# Patient Record
Sex: FEMALE | Race: WHITE | NOT HISPANIC OR LATINO | URBAN - METROPOLITAN AREA
[De-identification: names, ages, dates, MRNs, and addresses within clinical notes are randomized per-mention and may not be internally consistent; named-entity substitution may affect disease eponyms.]

---
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
## 2024-02-21 ENCOUNTER — OFFICE VISIT (OUTPATIENT)
Dept: FAMILY MEDICINE CLINIC | Facility: CLINIC | Age: 19
End: 2024-02-21
Payer: COMMERCIAL

## 2024-02-21 VITALS
HEIGHT: 60 IN | DIASTOLIC BLOOD PRESSURE: 72 MMHG | WEIGHT: 103.8 LBS | BODY MASS INDEX: 20.38 KG/M2 | SYSTOLIC BLOOD PRESSURE: 112 MMHG | OXYGEN SATURATION: 99 % | HEART RATE: 78 BPM | TEMPERATURE: 97.6 F

## 2024-02-21 DIAGNOSIS — R21 RASH: Primary | ICD-10-CM

## 2024-02-21 DIAGNOSIS — B35.9 RINGWORM: ICD-10-CM

## 2024-02-21 PROCEDURE — 99213 OFFICE O/P EST LOW 20 MIN: CPT | Performed by: STUDENT IN AN ORGANIZED HEALTH CARE EDUCATION/TRAINING PROGRAM

## 2024-02-21 RX ORDER — CLOTRIMAZOLE AND BETAMETHASONE DIPROPIONATE 10; .64 MG/G; MG/G
CREAM TOPICAL 2 TIMES DAILY
Qty: 45 G | Refills: 0 | Status: SHIPPED | OUTPATIENT
Start: 2024-02-21

## 2024-02-21 NOTE — PROGRESS NOTES
Name: Soraya Gómez      : 2005      MRN: [id number]  Encounter Provider: Silvestre Pearson MD  Encounter Date: 2024   Encounter department: [facility] PRIMARY CARE    Assessment & Plan     1. Rash  -     clotrimazole-betamethasone (LOTRISONE) 1-0.05 % cream; Apply topically 2 (two) times a day    2. Ringworm  -     clotrimazole-betamethasone (LOTRISONE) 1-0.05 % cream; Apply topically 2 (two) times a day    Rash may be a nummular eczema versus ringworm unclear.  Will trial Lotrisone if no improvement discussed with patient potential need for biopsy.  She will follow-up in 4-6 weeks.       Subjective      Rash  Pertinent negatives include no congestion, cough, diarrhea, fatigue, fever, rhinorrhea or shortness of breath.     Is a 18-year-old female presents the office companied by mother for concerns of transient rash.  Patient states the rash started approximately 2 months ago this is a small lesions that appear last for approximately 3 weeks and self resolved.  States the rash is itchy denies any exposure to new contacts.  Has been not utilizing any OTC creams.  Denies any new medications.      Review of Systems   Constitutional:  Negative for activity change, appetite change, chills, fatigue and fever.   HENT:  Negative for congestion, dental problem, drooling, ear discharge, ear pain, facial swelling, postnasal drip, rhinorrhea and sinus pain.    Eyes:  Negative for photophobia, pain, discharge and itching.   Respiratory:  Negative for apnea, cough, chest tightness and shortness of breath.    Cardiovascular:  Negative for chest pain and leg swelling.   Gastrointestinal:  Negative for abdominal distention, abdominal pain, anal bleeding, constipation, diarrhea and nausea.   Endocrine: Negative for cold intolerance, heat intolerance and polydipsia.   Genitourinary:  Negative for difficulty urinating.   Musculoskeletal:  Negative for arthralgias, gait problem, joint swelling and myalgias.    Skin:  Positive for rash. Negative for color change and pallor.   Allergic/Immunologic: Negative for immunocompromised state.   Neurological:  Negative for dizziness, seizures, facial asymmetry, weakness, light-headedness, numbness and headaches.   Psychiatric/Behavioral:  Negative for agitation, behavioral problems, confusion, decreased concentration and dysphoric mood.    All other systems reviewed and are negative.      Current Outpatient Medications on File Prior to Visit   Medication Sig    acetaminophen (TYLENOL) 500 mg tablet Take 500 mg by mouth every 6 (six) hours as needed for mild pain    Tri-Lo-Padmini 0.18/0.215/0.25 MG-25 MCG per tablet take 1 tablet by mouth once daily       Objective     /72 (BP Location: Left arm, Patient Position: Sitting, Cuff Size: Adult)   Pulse 78   Temp 97.6 °F (36.4 °C) (Tympanic)   Ht 5' (1.524 m)   Wt 47.1 kg (103 lb 12.8 oz)   SpO2 99%   BMI 20.27 kg/m²     Physical Exam  Vitals and nursing note reviewed.   HENT:      Head: Normocephalic and atraumatic.      Right Ear: Tympanic membrane normal.      Left Ear: Tympanic membrane normal.      Nose: Nose normal.      Mouth/Throat:      Mouth: Mucous membranes are moist.   Eyes:      Pupils: Pupils are equal, round, and reactive to light.   Cardiovascular:      Rate and Rhythm: Normal rate and regular rhythm.   Pulmonary:      Effort: Pulmonary effort is normal.   Abdominal:      General: Abdomen is flat.   Musculoskeletal:         General: Normal range of motion.      Cervical back: Normal range of motion.   Skin:     General: Skin is warm.      Capillary Refill: Capillary refill takes less than 2 seconds.   Neurological:      General: No focal deficit present.      Mental Status: She is alert and oriented to person, place, and time.   Psychiatric:         Mood and Affect: Mood normal.       Silvestre Pearson MD

## 2024-03-03 DIAGNOSIS — Z30.9 ENCOUNTER FOR CONTRACEPTIVE MANAGEMENT, UNSPECIFIED TYPE: ICD-10-CM

## 2024-03-04 RX ORDER — NORGESTIMATE AND ETHINYL ESTRADIOL 7DAYSX3 LO
1 KIT ORAL DAILY
Qty: 28 TABLET | Refills: 2 | Status: SHIPPED | OUTPATIENT
Start: 2024-03-04

## 2024-05-30 DIAGNOSIS — Z30.9 ENCOUNTER FOR CONTRACEPTIVE MANAGEMENT, UNSPECIFIED TYPE: ICD-10-CM

## 2024-05-31 RX ORDER — NORGESTIMATE AND ETHINYL ESTRADIOL 7DAYSX3 LO
1 KIT ORAL DAILY
Qty: 28 TABLET | Refills: 2 | Status: SHIPPED | OUTPATIENT
Start: 2024-05-31

## 2024-06-14 ENCOUNTER — ULTRASOUND (OUTPATIENT)
Dept: OBGYN CLINIC | Facility: CLINIC | Age: 19
End: 2024-06-14
Payer: COMMERCIAL

## 2024-06-14 ENCOUNTER — TELEPHONE (OUTPATIENT)
Age: 19
End: 2024-06-14

## 2024-06-14 VITALS
DIASTOLIC BLOOD PRESSURE: 68 MMHG | HEIGHT: 60 IN | BODY MASS INDEX: 20.5 KG/M2 | WEIGHT: 104.4 LBS | SYSTOLIC BLOOD PRESSURE: 100 MMHG

## 2024-06-14 DIAGNOSIS — Z34.92 SECOND TRIMESTER PREGNANCY: ICD-10-CM

## 2024-06-14 DIAGNOSIS — N91.2 AMENORRHEA: Primary | ICD-10-CM

## 2024-06-14 PROCEDURE — 99203 OFFICE O/P NEW LOW 30 MIN: CPT | Performed by: OBSTETRICS & GYNECOLOGY

## 2024-06-14 PROCEDURE — 76801 OB US < 14 WKS SINGLE FETUS: CPT | Performed by: OBSTETRICS & GYNECOLOGY

## 2024-06-14 RX ORDER — PRENATAL WITH FERROUS FUM AND FOLIC ACID 3080; 920; 120; 400; 22; 1.84; 3; 20; 10; 1; 12; 200; 27; 25; 2 [IU]/1; [IU]/1; MG/1; [IU]/1; MG/1; MG/1; MG/1; MG/1; MG/1; MG/1; UG/1; MG/1; MG/1; MG/1; MG/1
1 TABLET ORAL DAILY
Qty: 90 TABLET | Refills: 1 | Status: SHIPPED | OUTPATIENT
Start: 2024-06-14

## 2024-06-14 NOTE — PROGRESS NOTES
Assessment  18 y.o.  presenting for amenorrhea. Pregnancy confirmed, dating inconsistent with LMP. JULIA 24 (understands tentative and subject to confirmation by MFM).    Plan  Diagnoses and all orders for this visit:    Amenorrhea  -     AMB US OB < 14 weeks single or first gestation level 1    Second trimester pregnancy  -     Ambulatory Referral to Maternal Fetal Medicine; Future  -     Prenatal 27-1 MG TABS; Take 1 tablet by mouth in the morning  - Prenatal vitamin  - Prenatal Nursing Intake in 2 weeks        - Prenatal H&P in 4 weeks     ____________________________________________________________      Subjective   Soraya Gómez is a 18 y.o. G0 with an LMP of Patient's last menstrual period was 2024 (approximate). (10w4d by LMP) who presents for amenorrhea. She notes she took a home pregnancy test and it was positive. She is currently otherwise without complaint. Some intermittent nausea noted, less frequent than prior.    Patient notes that this pregnancy was unplanned and until recently was unsure if she was planning childbearing. Plans to continue pregnancy and not interested in discussing alternatives. She was using contraception at the time, but inconsistent with taking OCP. She reports she is certain of her LMP and that she has regular menses. She has has no vaginal bleeding since her LMP.  First pregnancy.     The following portions of the patient's history were reviewed and updated as appropriate: allergies, current medications, past medical history, past social history, past surgical history, and problem list.    Review of Systems  Review of Systems   Constitutional:  Negative for chills, fatigue and fever.   Respiratory:  Negative for shortness of breath.    Cardiovascular:  Negative for chest pain and palpitations.   Gastrointestinal:  Positive for nausea and vomiting. Negative for abdominal distention, abdominal pain, constipation and diarrhea.   Genitourinary:  Positive for menstrual  problem (amenorrhea). Negative for dysuria, flank pain, frequency, genital sores, pelvic pain, vaginal discharge and vaginal pain.   Skin:  Negative for rash and wound.   Neurological:  Negative for dizziness, light-headedness and headaches.          Objective  /68   Ht 5' (1.524 m)   Wt 47.4 kg (104 lb 6.4 oz)   LMP 04/01/2024 (Approximate)   BMI 20.39 kg/m²       Physical Exam:  Physical Exam  Vitals reviewed. Exam conducted with a chaperone present.   Constitutional:       General: She is not in acute distress.     Appearance: Normal appearance. She is not ill-appearing, toxic-appearing or diaphoretic.   Eyes:      General: No scleral icterus.        Right eye: No discharge.         Left eye: No discharge.      Conjunctiva/sclera: Conjunctivae normal.   Cardiovascular:      Rate and Rhythm: Normal rate.   Pulmonary:      Effort: Pulmonary effort is normal. No respiratory distress.   Abdominal:      General: There is no distension.      Palpations: There is no mass.      Tenderness: There is no abdominal tenderness. There is no guarding or rebound.      Hernia: No hernia is present.   Genitourinary:     General: Normal vulva.      Exam position: Lithotomy position.      Labia:         Right: No rash, tenderness or lesion.         Left: No rash, tenderness or lesion.       Urethra: No prolapse, urethral swelling or urethral lesion.      Vagina: No bleeding.   Musculoskeletal:         General: No swelling.   Skin:     General: Skin is warm and dry.      Coloration: Skin is not jaundiced or pale.      Findings: No bruising or erythema.   Neurological:      Mental Status: She is alert.   Psychiatric:         Mood and Affect: Mood normal.         Behavior: Behavior normal.         Thought Content: Thought content normal.         Judgment: Judgment normal.

## 2024-06-17 ENCOUNTER — LAB (OUTPATIENT)
Dept: LAB | Facility: MEDICAL CENTER | Age: 19
End: 2024-06-17
Payer: COMMERCIAL

## 2024-06-17 ENCOUNTER — INITIAL PRENATAL (OUTPATIENT)
Dept: OBGYN CLINIC | Facility: MEDICAL CENTER | Age: 19
End: 2024-06-17
Payer: COMMERCIAL

## 2024-06-17 ENCOUNTER — TELEPHONE (OUTPATIENT)
Age: 19
End: 2024-06-17

## 2024-06-17 VITALS
HEIGHT: 60 IN | BODY MASS INDEX: 20.97 KG/M2 | WEIGHT: 106.8 LBS | DIASTOLIC BLOOD PRESSURE: 62 MMHG | SYSTOLIC BLOOD PRESSURE: 92 MMHG

## 2024-06-17 DIAGNOSIS — Z34.01 ENCOUNTER FOR SUPERVISION OF NORMAL FIRST PREGNANCY IN FIRST TRIMESTER: Primary | ICD-10-CM

## 2024-06-17 DIAGNOSIS — Z31.430 ENCOUNTER OF FEMALE FOR TESTING FOR GENETIC DISEASE CARRIER STATUS FOR PROCREATIVE MANAGEMENT: ICD-10-CM

## 2024-06-17 DIAGNOSIS — Z34.01 ENCOUNTER FOR SUPERVISION OF NORMAL FIRST PREGNANCY IN FIRST TRIMESTER: ICD-10-CM

## 2024-06-17 LAB
ABO GROUP BLD: NORMAL
BACTERIA UR QL AUTO: NORMAL /HPF
BASOPHILS # BLD AUTO: 0.04 THOUSANDS/ÂΜL (ref 0–0.1)
BASOPHILS NFR BLD AUTO: 0 % (ref 0–1)
BILIRUB UR QL STRIP: NEGATIVE
BLD GP AB SCN SERPL QL: NEGATIVE
CLARITY UR: CLEAR
COLOR UR: ABNORMAL
EOSINOPHIL # BLD AUTO: 0.02 THOUSAND/ÂΜL (ref 0–0.61)
EOSINOPHIL NFR BLD AUTO: 0 % (ref 0–6)
ERYTHROCYTE [DISTWIDTH] IN BLOOD BY AUTOMATED COUNT: 13.6 % (ref 11.6–15.1)
GLUCOSE UR STRIP-MCNC: NEGATIVE MG/DL
HBV SURFACE AB SER-ACNC: 6.11 MIU/ML
HBV SURFACE AG SER QL: NORMAL
HCT VFR BLD AUTO: 36.5 % (ref 34.8–46.1)
HCV AB SER QL: NORMAL
HGB BLD-MCNC: 12.3 G/DL (ref 11.5–15.4)
HGB UR QL STRIP.AUTO: NEGATIVE
HIV 1+2 AB+HIV1 P24 AG SERPL QL IA: NORMAL
HIV 2 AB SERPL QL IA: NORMAL
HIV1 AB SERPL QL IA: NORMAL
HIV1 P24 AG SERPL QL IA: NORMAL
IMM GRANULOCYTES # BLD AUTO: 0.05 THOUSAND/UL (ref 0–0.2)
IMM GRANULOCYTES NFR BLD AUTO: 0 % (ref 0–2)
KETONES UR STRIP-MCNC: NEGATIVE MG/DL
LEUKOCYTE ESTERASE UR QL STRIP: NEGATIVE
LYMPHOCYTES # BLD AUTO: 1.57 THOUSANDS/ÂΜL (ref 0.6–4.47)
LYMPHOCYTES NFR BLD AUTO: 13 % (ref 14–44)
MCH RBC QN AUTO: 29.9 PG (ref 26.8–34.3)
MCHC RBC AUTO-ENTMCNC: 33.7 G/DL (ref 31.4–37.4)
MCV RBC AUTO: 89 FL (ref 82–98)
MONOCYTES # BLD AUTO: 0.61 THOUSAND/ÂΜL (ref 0.17–1.22)
MONOCYTES NFR BLD AUTO: 5 % (ref 4–12)
NEUTROPHILS # BLD AUTO: 9.59 THOUSANDS/ÂΜL (ref 1.85–7.62)
NEUTS SEG NFR BLD AUTO: 82 % (ref 43–75)
NITRITE UR QL STRIP: POSITIVE
NON-SQ EPI CELLS URNS QL MICRO: NORMAL /HPF
NRBC BLD AUTO-RTO: 0 /100 WBCS
PH UR STRIP.AUTO: 7.5 [PH]
PLATELET # BLD AUTO: 220 THOUSANDS/UL (ref 149–390)
PMV BLD AUTO: 11.5 FL (ref 8.9–12.7)
PROT UR STRIP-MCNC: ABNORMAL MG/DL
RBC # BLD AUTO: 4.12 MILLION/UL (ref 3.81–5.12)
RBC #/AREA URNS AUTO: NORMAL /HPF
RH BLD: POSITIVE
RUBV IGG SERPL IA-ACNC: 198.3 IU/ML
SP GR UR STRIP.AUTO: 1.01 (ref 1–1.03)
SPECIMEN EXPIRATION DATE: NORMAL
TREPONEMA PALLIDUM IGG+IGM AB [PRESENCE] IN SERUM OR PLASMA BY IMMUNOASSAY: NORMAL
UROBILINOGEN UR STRIP-ACNC: <2 MG/DL
WBC # BLD AUTO: 11.88 THOUSAND/UL (ref 4.31–10.16)
WBC #/AREA URNS AUTO: NORMAL /HPF

## 2024-06-17 PROCEDURE — 87077 CULTURE AEROBIC IDENTIFY: CPT

## 2024-06-17 PROCEDURE — 87340 HEPATITIS B SURFACE AG IA: CPT

## 2024-06-17 PROCEDURE — 87086 URINE CULTURE/COLONY COUNT: CPT

## 2024-06-17 PROCEDURE — 85025 COMPLETE CBC W/AUTO DIFF WBC: CPT

## 2024-06-17 PROCEDURE — 86900 BLOOD TYPING SEROLOGIC ABO: CPT

## 2024-06-17 PROCEDURE — 86803 HEPATITIS C AB TEST: CPT

## 2024-06-17 PROCEDURE — 86706 HEP B SURFACE ANTIBODY: CPT

## 2024-06-17 PROCEDURE — 86762 RUBELLA ANTIBODY: CPT

## 2024-06-17 PROCEDURE — 86850 RBC ANTIBODY SCREEN: CPT

## 2024-06-17 PROCEDURE — 86901 BLOOD TYPING SEROLOGIC RH(D): CPT

## 2024-06-17 PROCEDURE — 36415 COLL VENOUS BLD VENIPUNCTURE: CPT

## 2024-06-17 PROCEDURE — 87389 HIV-1 AG W/HIV-1&-2 AB AG IA: CPT

## 2024-06-17 PROCEDURE — 86780 TREPONEMA PALLIDUM: CPT

## 2024-06-17 PROCEDURE — 87186 SC STD MICRODIL/AGAR DIL: CPT

## 2024-06-17 PROCEDURE — T1001 NURSING ASSESSMENT/EVALUATN: HCPCS

## 2024-06-17 PROCEDURE — 81001 URINALYSIS AUTO W/SCOPE: CPT

## 2024-06-17 NOTE — PROGRESS NOTES
OB INTAKE INTERVIEW 2024    Patient is 18 y.o. who presents for OB intake at 16w2d.  She is accompanied by her significant other and her mother during this encounter.  The father of her baby (Ryan) is involved in the pregnancy.      Patient's last menstrual period was 2024 (approximate).  Ultrasound: Measured 15 weeks 6 days on 24  Estimated Date of Delivery: 24 changed by dating ultrasound.    Signs/Symptoms of Pregnancy  Current pregnancy symptoms: none  Constipation no  Headaches no  Cramping/spotting no  PICA cravings no    Diabetes-  Body mass index is 20.86 kg/m².  If patient has 1 or more, please order early 1 hour GTT  History of GDM no  BMI >35 no  History of PCOS or current metformin use no  History of LGA/macrosomic infant (4000g/9lbs) no    If patient has 2 or more, please order early 1 hour GTT  BMI>30 no  AMA no  First degree relative with type 2 diabetes no  History of chronic HTN, hyperlipidemia, elevated A1C no  High risk race (, , ,  or ) no    Hypertension- if you answer yes to any of the following, please order baseline preeclampsia labs (cbc, comprehensive metabolic panel, urine protein creatinine ratio, uric acid)  History of of chronic HTN no  History of gestational HTN no  History of preeclampsia, eclampsia, or HELLP syndrome no  History of diabetes no  History of lupus, autoimmune disease, kidney disease no    Thyroid- if yes order TSH with reflex T4  History of thyroid disease no    Bleeding Disorder or Hx of DVT-patient or first degree relative with history of. Order the following if not done previously.   (Factor V, antithrombin III, prothrombin gene mutation, protein C and S Ag, lupus anticoagulant, anticardiolipin, beta-2 glycoprotein)   no    OB/GYN-  History of abnormal pap smear n/a      Date of last pap smear Not on file  History of HPV n/a  History of Herpes/HSV no  History of other STI  (gonorrhea, chlamydia, trich) no  History of prior  no  History of prior  no  History of  delivery prior to 36 weeks 6 days no  History of blood transfusion no  Ok for blood transfusion YES    Substance screening-   History of tobacco use no  Currently using tobacco no - vapes occasionally - nicotine and flavoring.  Currently using alcohol no  Presently using drugs no  Past drug use  no  IV drug use- no  Partner drug use no  Parent/Family drug use YES father passed away from drug overdose  Substance Use Screen Level - low risk    MRSA Screening-   Does the pt have a hx of MRSA? no    Immunizations:  Influenza vaccine given this season NO   Discussed Tdap vaccine YES   COVID Vaccine YES x3    Genetic/MFM-  Do you or your partner have a history of any of the following in yourselves or first degree relatives?  Cystic fibrosis no  Spinal muscular atrophy no  Hemoglobinopathy/Sickle Cell/Thalassemia no  Fragile X Intellectual Disability no    If yes, discuss Carrier Screening and recommend consultation with MFM/Genetic Counseling and place specific [facility] Referral for.    If no, discuss Carrier Screening being completed once in a lifetime as a standard of care lab test. Place orders for Cystic Fibrosis Gene Test ([id number]) and Spinal Muscular Atrophy DNA ([id number]).  Patient does desire testing for Cystic Fibrosis and Spinal Muscular Atrophy.  Orders placed.    Appointment for level ll Ultrasound at [facility]  has not been scheduled. Patient encouraged to call to schedule.  Number provided.    Interview education  [facility] Pregnancy Essentials Book reviewed, discussed and attached to their AVS YES     Prenatal lab work scripts YES    Extra labs ordered: Cystic Fibrosis gene test and Spinal muscular atrophy DNA    Aspirin/Preeclampsia Screen    Risk Level Risk Factor Recommendation   LOW Prior Uncomplicated full-term delivery no No Aspirin recommendation        MODERATE Nulliparity YES Recommend low-dose aspirin  if     BMI>30 no 2 or more moderate risk factors    Family History Preeclampsia (mother/sister) no     35yr old or greater no      or Low Socioeconomic no     IVF Pregnancy  no     Personal History Risks (low birth weight, prior adverse preg outcome, >10yr preg interval) no         HIGH History of Preeclampsia no Recommend low-dose aspirin if     Multifetal gestation no 1 or more high risk factors    Chronic HTN no     Type 1 or 2 Diabetes no     Renal Disease no     Autoimmune Disease  no      Contraindications to ASA therapy:  NSAID/ ASA allergy: no  Nasal polyps: no  Asthma with history of ASA induced bronchospasm: no  Relative contraindications:  History of GI bleed: no  Active peptic ulcer disease: no  Severe hepatic dysfunction: no    Patient does not meet recommendation to take ASA 162mg during this pregnancy from 12-36wks to lower her risk of preeclampsia.    The patient has a history now or in prior pregnancy notable for: none    Details that I feel the provider should be aware of: Soraya came in for her OB intake at 16w2d. Patient presents with her mother Alona and her boyfriend (FOB) Ryan. Patient does not have any current complaints and reports feels well. Patient is late to prenatal care. This is her first pregnancy- patient reports it was unplanned but welcomed. Patient was on oral contraceptives at time of conception but states would forget to take it at times. Contraception options briefly reviewed with patient for after delivery. Considering IUD. Prenatal panel and carrier screening ordered. Carrier screening education provided. EPDS score was 11. Patient declined baby and me referral at this time. Patient has not scheduled early anatomy scan with M. Encouraged patient to call and schedule as soon as possible. Number provided. No blood type on file. Patient encouraged to get prenatal panel completed- will go to lab next door to get blood work done.     PN1 visit scheduled. The  patient was oriented to our practice, the navigator role, reviewed delivering physicians and [facility] for delivery. All questions were answered.    Interviewed by: Marciano Keating RN

## 2024-06-17 NOTE — PATIENT INSTRUCTIONS
Congratulations! Please review our Pregnancy Essential Guide from our network's website.     [facility] Pregnancy Essentials Guide  [facility] ([email or web address])

## 2024-06-19 ENCOUNTER — TELEPHONE (OUTPATIENT)
Age: 19
End: 2024-06-19

## 2024-06-19 DIAGNOSIS — O23.42 URINARY TRACT INFECTION IN MOTHER DURING SECOND TRIMESTER OF PREGNANCY: Primary | ICD-10-CM

## 2024-06-19 LAB — BACTERIA UR CULT: ABNORMAL

## 2024-06-19 RX ORDER — NITROFURANTOIN 25; 75 MG/1; MG/1
100 CAPSULE ORAL 2 TIMES DAILY
Qty: 14 CAPSULE | Refills: 0 | Status: SHIPPED | OUTPATIENT
Start: 2024-06-19 | End: 2024-06-27

## 2024-06-19 NOTE — TELEPHONE ENCOUNTER
Per communication consent lm for pt to call back.      If patient calls back and results or message was reviewed/appointment made if needed- please close out result note. Thank you

## 2024-06-19 NOTE — TELEPHONE ENCOUNTER
"----- Message from Omaira Diaz MD sent at 6/19/2024  1:28 PM EDT -----  Please call pt with results.    UCx shows UTI. I will send treatment in for her. Please add \"needs cc urine\" to next appt notes for HANSA.     Prenatal panel is notable for low titers to hepatitis B. This means she is unvaccinated or are no longer protected by prior vaccine. Hepatitis B is a virus that you can pass to your baby during pregnancy or delivery; fortunately, labs do not show that you have been exposed to this currently. Most people have been vaccinated during childhood, but it is possible for the immunity to wear off over the years. This is a safe vaccine to receive in pregnancy and you should follow up with your PCP to discuss a booster shot.   "

## 2024-06-19 NOTE — RESULT ENCOUNTER NOTE
"Please call pt with results.    UCx shows UTI. I will send treatment in for her. Please add \"needs cc urine\" to next appt notes for HANSA.     Prenatal panel is notable for low titers to hepatitis B. This means she is unvaccinated or are no longer protected by prior vaccine. Hepatitis B is a virus that you can pass to your baby during pregnancy or delivery; fortunately, labs do not show that you have been exposed to this currently. Most people have been vaccinated during childhood, but it is possible for the immunity to wear off over the years. This is a safe vaccine to receive in pregnancy and you should follow up with your PCP to discuss a booster shot. "

## 2024-06-21 NOTE — TELEPHONE ENCOUNTER
Attempt #2- HIPAA does not indicate ok to [facility] with detail so left another voicemail to call back

## 2024-06-26 ENCOUNTER — INITIAL PRENATAL (OUTPATIENT)
Dept: OBGYN CLINIC | Facility: CLINIC | Age: 19
End: 2024-06-26
Payer: COMMERCIAL

## 2024-06-26 ENCOUNTER — TELEPHONE (OUTPATIENT)
Age: 19
End: 2024-06-26

## 2024-06-26 ENCOUNTER — TELEPHONE (OUTPATIENT)
Dept: PERINATAL CARE | Facility: OTHER | Age: 19
End: 2024-06-26

## 2024-06-26 VITALS — DIASTOLIC BLOOD PRESSURE: 60 MMHG | SYSTOLIC BLOOD PRESSURE: 104 MMHG | WEIGHT: 108.2 LBS | BODY MASS INDEX: 21.13 KG/M2

## 2024-06-26 DIAGNOSIS — Z34.92 SECOND TRIMESTER PREGNANCY: ICD-10-CM

## 2024-06-26 DIAGNOSIS — O23.42 UTI IN PREGNANCY, ANTEPARTUM, SECOND TRIMESTER: ICD-10-CM

## 2024-06-26 DIAGNOSIS — Z3A.17 17 WEEKS GESTATION OF PREGNANCY: ICD-10-CM

## 2024-06-26 DIAGNOSIS — O09.892 HIGH RISK TEEN PREGNANCY IN SECOND TRIMESTER: ICD-10-CM

## 2024-06-26 DIAGNOSIS — Z78.9 NONIMMUNE TO HEPATITIS B VIRUS: ICD-10-CM

## 2024-06-26 PROCEDURE — 87491 CHLMYD TRACH DNA AMP PROBE: CPT | Performed by: ADVANCED PRACTICE MIDWIFE

## 2024-06-26 PROCEDURE — 99213 OFFICE O/P EST LOW 20 MIN: CPT | Performed by: ADVANCED PRACTICE MIDWIFE

## 2024-06-26 PROCEDURE — 87591 N.GONORRHOEAE DNA AMP PROB: CPT | Performed by: ADVANCED PRACTICE MIDWIFE

## 2024-06-26 NOTE — PROGRESS NOTES
Initial Prenatal Visit  [facility] [address] [place]    Assessment/Plan:  Soraya is a 18 y.o. year old  at 17w4d who presents for initial prenatal visit.     Supervision of normal pregnancy  - Prenatal labs reviewed and normal.  Blood type: O positive  - Aneuploidy screening discussed.  Patient has appointment with [facility] - tomorrow  - Routine cervical cancer screening: Pap Up to date.  - Routine STI Screening: GC/Chlamydia sent today.  HIV/Hep B/Syphilis ordered in prenatal panel.  - Patient Education: Patient was counseled regarding diet, exercise, weight gain, foods to avoid, vaccines in pregnancy, aneuploidy screening, travel precautions to include seat belt use and VTE risk reduction.  She has been provided our pregnancy packet which includes how and when to contact providers, medication recommendations, dietary suggestions, breastfeeding information as well as websites for additional information, hospital and delivery concerns.       Additional Pregnancy Problems:   1. High risk teen pregnancy in second trimester  2. UTI in pregnancy, antepartum, second trimester  3. Nonimmune to hepatitis B virus  4. Second trimester pregnancy  -     Chlamydia/GC amplified DNA by PCR  -     Alpha fetoprotein, maternal; Future  5. 17 weeks gestation of pregnancy  Assessment & Plan:  Anatomy scan 2024  - Started Macrobid 2024  - family support- good. Declined- Nurse partnership  - next visit 4 weeks  Orders:  -     Chlamydia/GC amplified DNA by PCR  -     Alpha fetoprotein, maternal; Future        Subjective:   CC:  Desires prenatal care  Soraya Gómez is a 18 y.o.  female who presents for prenatal care.  Pregnancy ROS: no leakage of fluid, pelvic pain, or vaginal bleeding.  Occasional nausea/vomiting.    The following portions of the patient's history were reviewed and updated as appropriate: allergies, current medications, past family history, past medical history,  obstetric history, gynecologic history, past social history, past surgical history and problem list.      Objective:  /60   Wt 49.1 kg (108 lb 3.2 oz)   LMP 2024 (Approximate)   BMI 21.13 kg/m²   Pregravid Weight/BMI: 46.7 kg (103 lb) (BMI 20.12)  Current Weight: 49.1 kg (108 lb 3.2 oz)   Total Weight Gain: 2.359 kg (5 lb 3.2 oz)   Pre-Rani Vitals    Flowsheet Row Most Recent Value   Prenatal Assessment    Fetal Heart Rate 152   Prenatal Vitals    Blood Pressure 104/60   Weight - Scale 49.1 kg (108 lb 3.2 oz)   Urine Albumin/Glucose    Dilation/Effacement/Station    Vaginal Drainage    Edema    LLE Edema None   RLE Edema None         General: Well appearing, no distress  Respiratory: Normal respiratory rate, lungs clear to auscultation, no wheezing or rales  Cardiovascular: Regular rate and rhythm, no murmurs, rubs, or gallops  Breasts: Normal bilaterally, nontender without masses, asymmetry, or nipple discharge.  Abdomen: Soft, gravid, nontender  : Urethra normal. Normal labia majora and minora. Vagina normal.  No vaginal bleeding. No vaginal discharge. Cervix visually closed.   Extremities: Warm and well perfused.  Non tender.  No edema.

## 2024-06-26 NOTE — PATIENT INSTRUCTIONS

## 2024-06-26 NOTE — TELEPHONE ENCOUNTER
Called [facility] and spoke to Lorenzo who confirmed that PT's [employer] for tmrw's MFM appt was [email or web address]'jovanna.

## 2024-06-26 NOTE — TELEPHONE ENCOUNTER
Called [employer] spoke with Lorenzo @ # [phone] to set up [employer] transportation for patient's Maternal Fetal Medicine appointment.  Appointment scheduled on  June 27, 2024 at 2:30 PM at our [place].       Spoke with patient and explained [employer] will pick-up patient at 1:15 PM for Maternal Fetal Medicine appointment.   Patient instructed she has 5 minutes maximum to get into car upon arrival. Patient verbalized understanding.     Confirmed phone and address.   [phone]  [facility] [place] [address]

## 2024-06-26 NOTE — ASSESSMENT & PLAN NOTE
Anatomy scan 6/27/2024  - Started Macrobid 6/24/2024  - family support- good. Declined- Nurse partnership  - next visit 4 weeks

## 2024-06-27 ENCOUNTER — ROUTINE PRENATAL (OUTPATIENT)
Dept: PERINATAL CARE | Facility: OTHER | Age: 19
End: 2024-06-27
Payer: COMMERCIAL

## 2024-06-27 VITALS
WEIGHT: 107.4 LBS | DIASTOLIC BLOOD PRESSURE: 50 MMHG | BODY MASS INDEX: 20.28 KG/M2 | HEIGHT: 61 IN | SYSTOLIC BLOOD PRESSURE: 100 MMHG | HEART RATE: 87 BPM

## 2024-06-27 DIAGNOSIS — Z36.3 ENCOUNTER FOR ANTENATAL SCREENING FOR MALFORMATIONS: ICD-10-CM

## 2024-06-27 DIAGNOSIS — Z34.92 SECOND TRIMESTER PREGNANCY: ICD-10-CM

## 2024-06-27 DIAGNOSIS — Z3A.17 17 WEEKS GESTATION OF PREGNANCY: Primary | ICD-10-CM

## 2024-06-27 DIAGNOSIS — Z36.89 ENCOUNTER FOR FETAL ANATOMIC SURVEY: ICD-10-CM

## 2024-06-27 PROCEDURE — 99203 OFFICE O/P NEW LOW 30 MIN: CPT | Performed by: NURSE PRACTITIONER

## 2024-06-27 PROCEDURE — 76805 OB US >/= 14 WKS SNGL FETUS: CPT | Performed by: OBSTETRICS & GYNECOLOGY

## 2024-06-27 NOTE — LETTER
"2024     Omaira Diaz MD  [address] [place] [address]  [address] [place] [address]    Patient: Soraya Gómez   YOB: 2005   Date of Visit: 2024       Dear Dr. Diaz:    Thank you for referring Soraya Gómez to me for evaluation. Below are my notes for this consultation.    If you have questions, please do not hesitate to call me. I look forward to following your patient along with you.         Sincerely,        Amara Chahal MD        CC: No Recipients    Sara Allen  2024  4:03 PM  Sign when Signing Visit  Patient chose to have [facility] [id number] Non-Invasive Prenatal Screen [id number] MT21 PLUS Core + SCA, NO fetal sex.  Patient choose to be billed through insurance.     Patient given brochure and is aware [facility] will contact patient's insurance and coordinate coverage.  Provided [facility] contact information. General inquiries [phone], Cost estimates [phone] and [facility] Billing [phone]. Website [email or web address]     Blood collection tubes labeled with patient identifiers (name, medical record number, and date of birth).     Filled out [facility] order form. Patient chose to be sent to an outpatient lab to complete blood work. .      If patient chose to have blood work drawn at a [facility] lab we requested patient notify MFM (via phone call or [employer] message) when blood collected so office can follow up on results.       Maternal Fetal Medicine will have results in approximately 5-7 business days and will call patient or notify via [employer].  Patient aware viewing lab result online will reveal fetal sex if ordered.    Patient verbalized understanding of all instructions and no questions at this time.      Amara Chahal MD  2024 12:00 PM  Sign when Signing Visit  [facility]: Soraya Gómez was seen today at 17w2d for anatomic survey ultrasound.  See ultrasound report under \"OB Procedures\" tab.  Please don't " hesitate to contact our office with any concerns or questions.  -Amara Chahal MD

## 2024-06-27 NOTE — PROGRESS NOTES
Patient chose to have [facility] [id number] Non-Invasive Prenatal Screen [id number] MT21 PLUS Core + SCA, NO fetal sex.  Patient choose to be billed through insurance.     Patient given brochure and is aware [facility] will contact patient's insurance and coordinate coverage.  Provided [facility] contact information. General inquiries [phone], Cost estimates [phone] and [facility] Billing [phone]. Website [email or web address].     Blood collection tubes labeled with patient identifiers (name, medical record number, and date of birth).     Filled out [facility] order form. Patient chose to be sent to an outpatient lab to complete blood work. .      If patient chose to have blood work drawn at a [facility] lab we requested patient notify MFM (via phone call or [employer] message) when blood collected so office can follow up on results.       Maternal Fetal Medicine will have results in approximately 5-7 business days and will call patient or notify via [employer].  Patient aware viewing lab result online will reveal fetal sex if ordered.    Patient verbalized understanding of all instructions and no questions at this time.

## 2024-06-27 NOTE — PROGRESS NOTES
"[facility]: Soraya Gómez was seen today at 17w2d for anatomic survey ultrasound.  See ultrasound report under \"OB Procedures\" tab.  Please don't hesitate to contact our office with any concerns or questions.  -Amara Chahal MD    "

## 2024-06-28 ENCOUNTER — APPOINTMENT (OUTPATIENT)
Dept: LAB | Facility: HOSPITAL | Age: 19
End: 2024-06-28
Attending: OBSTETRICS & GYNECOLOGY
Payer: COMMERCIAL

## 2024-06-28 ENCOUNTER — TELEPHONE (OUTPATIENT)
Facility: HOSPITAL | Age: 19
End: 2024-06-28

## 2024-06-28 ENCOUNTER — APPOINTMENT (OUTPATIENT)
Dept: LAB | Facility: HOSPITAL | Age: 19
End: 2024-06-28
Payer: COMMERCIAL

## 2024-06-28 DIAGNOSIS — Z33.1 PREGNANT STATE, INCIDENTAL: ICD-10-CM

## 2024-06-28 DIAGNOSIS — Z36.9 UNSPECIFIED ANTENATAL SCREENING: ICD-10-CM

## 2024-06-28 DIAGNOSIS — Z3A.17 17 WEEKS GESTATION OF PREGNANCY: ICD-10-CM

## 2024-06-28 DIAGNOSIS — Z31.430 ENCOUNTER OF FEMALE FOR TESTING FOR GENETIC DISEASE CARRIER STATUS FOR PROCREATIVE MANAGEMENT: ICD-10-CM

## 2024-06-28 DIAGNOSIS — Z34.92 SECOND TRIMESTER PREGNANCY: ICD-10-CM

## 2024-06-28 LAB
C TRACH DNA SPEC QL NAA+PROBE: NEGATIVE
N GONORRHOEA DNA SPEC QL NAA+PROBE: NEGATIVE

## 2024-06-28 PROCEDURE — 82105 ALPHA-FETOPROTEIN SERUM: CPT

## 2024-06-28 PROCEDURE — 36415 COLL VENOUS BLD VENIPUNCTURE: CPT

## 2024-06-28 NOTE — TELEPHONE ENCOUNTER
Left voicemail informing patient that the Dr would like us to extend her appointment to ensure we have adequate time to complete the study. Moved appointment from 11:30 to 10:15 and extended to an hour on the same day as her current scheduled apppointmetn (7/30). Requested she give our office a call back at [phone] with any questions or if she would need to reschedule.

## 2024-06-28 NOTE — TELEPHONE ENCOUNTER
----- Message from Amara Chahal MD sent at 6/28/2024 12:02 PM EDT -----  Regarding: Needs 60 minute follow-up appt  Good morning,  This patient is scheduled for follow up TV, growth and missed anatomy and needs 60 minutes. I apologize I didn't specify this when she checked out, could you look at the option to extend her appointment that is scheduled or reschedule in to 60 minute appt? I want to ensure we have adequate time to complete the study, please let her know this is the rationale if appt needs to be changed.  Thank you,  Amara Chahal MD

## 2024-06-30 LAB
2ND TRIMESTER 4 SCREEN SERPL-IMP: NORMAL
AFP ADJ MOM SERPL: 1.14
AFP INTERP AMN-IMP: NORMAL
AFP INTERP SERPL-IMP: NORMAL
AFP INTERP SERPL-IMP: NORMAL
AFP SERPL-MCNC: 62.5 NG/ML
AGE AT DELIVERY: 20.3 YR
GA METHOD: NORMAL
GA: 17.9 WEEKS
IDDM PATIENT QL: NO
MULTIPLE PREGNANCY: NO
NEURAL TUBE DEFECT RISK FETUS: 7850 %

## 2024-07-03 LAB
CITATION REF LAB TEST: NORMAL
CLINICAL INFO: NORMAL
ETHNIC BACKGROUND STATED: NORMAL
GENE DIS ANL CARRIER INTERP-IMP: NORMAL
GENE MUT TESTED BLD/T: NORMAL
LAB DIRECTOR NAME PROVIDER: NORMAL
REASON FOR REFERRAL (NARRATIVE): NORMAL
RECOMMENDATION PATIENT DOC-IMP: NORMAL
REF LAB TEST METHOD: NORMAL
SERVICE CMNT-IMP: NORMAL
SMN1 GENE MUT ANL BLD/T: NORMAL
SPECIMEN SOURCE: NORMAL

## 2024-07-06 LAB — MISCELLANEOUS LAB TEST RESULT: NORMAL

## 2024-07-09 LAB
CFTR FULL MUT ANL BLD/T SEQ: NORMAL
CITATION REF LAB TEST: NORMAL
CLINICAL INFO: NORMAL
ETHNIC BACKGROUND STATED: NORMAL
GENE DIS ANL CARRIER INTERP-IMP: NORMAL
INDICATION: NORMAL
LAB DIRECTOR NAME PROVIDER: NORMAL
RECOMMENDATION PATIENT DOC-IMP: NORMAL
REF LAB TEST METHOD: NORMAL
SERVICE CMNT-IMP: NORMAL
SPECIMEN SOURCE: NORMAL

## 2024-07-22 ENCOUNTER — ROUTINE PRENATAL (OUTPATIENT)
Dept: OBGYN CLINIC | Facility: CLINIC | Age: 19
End: 2024-07-22
Payer: COMMERCIAL

## 2024-07-22 VITALS — SYSTOLIC BLOOD PRESSURE: 104 MMHG | WEIGHT: 114.4 LBS | BODY MASS INDEX: 21.97 KG/M2 | DIASTOLIC BLOOD PRESSURE: 62 MMHG

## 2024-07-22 DIAGNOSIS — O09.892 HIGH RISK TEEN PREGNANCY IN SECOND TRIMESTER: Primary | ICD-10-CM

## 2024-07-22 DIAGNOSIS — O99.891 LEG CRAMPS IN PREGNANCY: ICD-10-CM

## 2024-07-22 DIAGNOSIS — Z34.92 SECOND TRIMESTER PREGNANCY: ICD-10-CM

## 2024-07-22 DIAGNOSIS — R25.2 LEG CRAMPS IN PREGNANCY: ICD-10-CM

## 2024-07-22 DIAGNOSIS — Z3A.20 20 WEEKS GESTATION OF PREGNANCY: ICD-10-CM

## 2024-07-22 PROCEDURE — 99213 OFFICE O/P EST LOW 20 MIN: CPT | Performed by: ADVANCED PRACTICE MIDWIFE

## 2024-07-22 NOTE — PROGRESS NOTES
Routine Prenatal Visit  [facility] [address] Stefan Hansen [place]    Assessment/Plan:  Soraya is a 18 y.o. year old  at 20w6d who presents for routine prenatal visit.     1. High risk teen pregnancy in second trimester  2. Leg cramps in pregnancy  -     Magnesium Oxide 250 MG TABS; Take 1 tablet (250 mg total) by mouth daily  3. Second trimester pregnancy  4. 20 weeks gestation of pregnancy  Assessment & Plan:  - reviewed 2nd trimester precautions  - NIPS/AFP- low risk  - growth scan 24  -  next visit  4 weeks        Subjective:     CC: Prenatal care    Soraya Gómez is a 18 y.o.  female who presents for routine prenatal care at 20w6d.  Pregnancy ROS: no leakage of fluid, pelvic pain, or vaginal bleeding.  Good fetal movement. Good protein intake. Drinking plenty of water.     The following portions of the patient's history were reviewed and updated as appropriate: allergies, current medications, past family history, past medical history, obstetric history, gynecologic history, past social history, past surgical history and problem list.      Objective:  /62   Wt 51.9 kg (114 lb 6.4 oz)   LMP 2024 (Approximate)   BMI 21.97 kg/m²   Pregravid Weight/BMI: 46.7 kg (103 lb) (BMI 19.78)  Current Weight: 51.9 kg (114 lb 6.4 oz)   Total Weight Gain: 5.171 kg (11 lb 6.4 oz)   Pre-Rani Vitals    Flowsheet Row Most Recent Value   Prenatal Assessment    Fetal Heart Rate 146   Fundal Height (cm) 19 cm   Movement Present   Prenatal Vitals    Blood Pressure 104/62   Weight - Scale 51.9 kg (114 lb 6.4 oz)   Urine Albumin/Glucose    Dilation/Effacement/Station    Vaginal Drainage    Edema    LLE Edema None   RLE Edema None           General: Well appearing, no distress  Respiratory: Unlabored breathing  Cardiovascular: Regular rate.  Abdomen: Soft, gravid, nontender  Fundal Height: Appropriate for gestational age.  Extremities: Warm and well perfused.  Non tender.

## 2024-07-22 NOTE — ASSESSMENT & PLAN NOTE
- reviewed 2nd trimester precautions  - NIPS/AFP- low risk  - growth scan 7/30/24  -  next visit  4 weeks

## 2024-07-26 PROBLEM — O23.42 UTI IN PREGNANCY, ANTEPARTUM, SECOND TRIMESTER: Status: RESOLVED | Noted: 2024-06-26 | Resolved: 2024-07-26

## 2024-07-30 ENCOUNTER — OFFICE VISIT (OUTPATIENT)
Dept: PERINATAL CARE | Facility: OTHER | Age: 19
End: 2024-07-30
Payer: COMMERCIAL

## 2024-07-30 VITALS
BODY MASS INDEX: 21.22 KG/M2 | HEIGHT: 61 IN | SYSTOLIC BLOOD PRESSURE: 112 MMHG | HEART RATE: 94 BPM | WEIGHT: 112.4 LBS | DIASTOLIC BLOOD PRESSURE: 60 MMHG

## 2024-07-30 DIAGNOSIS — Z36.3 ENCOUNTER FOR ANTENATAL SCREENING FOR MALFORMATION: ICD-10-CM

## 2024-07-30 DIAGNOSIS — O09.892 HIGH RISK TEEN PREGNANCY IN SECOND TRIMESTER: Primary | ICD-10-CM

## 2024-07-30 DIAGNOSIS — Z3A.22 22 WEEKS GESTATION OF PREGNANCY: ICD-10-CM

## 2024-07-30 PROCEDURE — 99213 OFFICE O/P EST LOW 20 MIN: CPT | Performed by: OBSTETRICS & GYNECOLOGY

## 2024-07-30 PROCEDURE — 76816 OB US FOLLOW-UP PER FETUS: CPT | Performed by: OBSTETRICS & GYNECOLOGY

## 2024-07-30 NOTE — PROGRESS NOTES
Ultrasound Probe Disinfection    A transvaginal ultrasound was performed.   Prior to use, disinfection was performed with High Level Disinfection Process ([employer]).  Probe serial number M2: [id number] was used.      Frances Cox  07/30/24  10:36 AM

## 2024-07-30 NOTE — PROGRESS NOTES
Soraya returns with her partner Ryan and her mother Alona at 22 weeks and 0 days for a follow-up growth scan to confirm normal interval fetal growth and to review the prior missed anatomy not seen well on her 17-week scan and to complete her cervical length screening..    Ultrasound today was able to complete her missed anatomy and no malformations are seen.  Her cervical length appears normal.  Fetal growth is concordant with her due date of December 3, 2024.  The femur length is measuring 8 days behind similar to her prior ultrasound.  Soraya is 5 foot and her partner Ryan is 5 foot 3 inches and they both report short stature in their families.    Recommend a follow-up ultrasound at 32 weeks for growth as teen pregnancies may have an increased risk for fetal growth restriction.     Pre visit time reviewing her records 5 minutes  Face to face time 5 minutes  Post visit time on documentation of note, updating her problem list, adding orders and prescriptions 5 minutes.  Procedures that were completed today were charged separately.   The level of decision making was straight forward.    Belle Ramirez MD

## 2024-07-30 NOTE — LETTER
August 1, 2024     Omaira Diaz MD  [address] [place] [address]  [address] [place] [address]    Patient: Soraya Gómez   YOB: 2005   Date of Visit: 7/30/2024       Dear Dr. Diaz:    Thank you for referring Soraya Gómez to me for evaluation. Below are my notes for this consultation.    If you have questions, please do not hesitate to call me. I look forward to following your patient along with you.         Sincerely,        Belle Ramirez MD        CC: No Recipients    Belle Ramirez MD  8/1/2024  6:28 AM  Sign when Signing Visit  Soraya returns with her partner Ryan and her mother Alona at 22 weeks and 0 days for a follow-up growth scan to confirm normal interval fetal growth and to review the prior missed anatomy not seen well on her 17-week scan and to complete her cervical length screening..    Ultrasound today was able to complete her missed anatomy and no malformations are seen.  Her cervical length appears normal.  Fetal growth is concordant with her due date of December 3, 2024.  The femur length is measuring 8 days behind similar to her prior ultrasound.  Soraya is 5 foot and her partner Ryan is 5 foot 3 inches and they both report short stature in their families.    Recommend a follow-up ultrasound at 32 weeks for growth as teen pregnancies may have an increased risk for fetal growth restriction.     Pre visit time reviewing her records 5 minutes  Face to face time 5 minutes  Post visit time on documentation of note, updating her problem list, adding orders and prescriptions 5 minutes.  Procedures that were completed today were charged separately.   The level of decision making was straight forward.    Belle Cox  7/30/2024 10:36 AM  Sign when Signing Visit  Ultrasound Probe Disinfection    A transvaginal ultrasound was performed.   Prior to use, disinfection was performed with High Level Disinfection Process ([employer]).  Probe serial number M2:  [id number] was used.      Frances Cox  07/30/24  10:36 AM

## 2024-08-08 ENCOUNTER — VBI (OUTPATIENT)
Dept: ADMINISTRATIVE | Facility: OTHER | Age: 19
End: 2024-08-08

## 2024-08-08 NOTE — TELEPHONE ENCOUNTER
08/08/24 10:49 AM     Chart reviewed for Child and Adolescent Well-Care Visits was/were not submitted to the patient's insurance.     Danyell Doyle MA   [facility]

## 2024-08-22 ENCOUNTER — ROUTINE PRENATAL (OUTPATIENT)
Dept: OBGYN CLINIC | Facility: CLINIC | Age: 19
End: 2024-08-22
Payer: COMMERCIAL

## 2024-08-22 VITALS — WEIGHT: 117.6 LBS | BODY MASS INDEX: 22.59 KG/M2 | SYSTOLIC BLOOD PRESSURE: 112 MMHG | DIASTOLIC BLOOD PRESSURE: 70 MMHG

## 2024-08-22 DIAGNOSIS — Z78.9 NONIMMUNE TO HEPATITIS B VIRUS: ICD-10-CM

## 2024-08-22 DIAGNOSIS — O09.892 HIGH RISK TEEN PREGNANCY IN SECOND TRIMESTER: ICD-10-CM

## 2024-08-22 DIAGNOSIS — Z34.92 SECOND TRIMESTER PREGNANCY: Primary | ICD-10-CM

## 2024-08-22 DIAGNOSIS — Z3A.25 25 WEEKS GESTATION OF PREGNANCY: ICD-10-CM

## 2024-08-22 PROCEDURE — 99213 OFFICE O/P EST LOW 20 MIN: CPT | Performed by: OBSTETRICS & GYNECOLOGY

## 2024-08-22 NOTE — PROGRESS NOTES
Assessment  19 y.o.  at 25w2d presenting for routine prenatal visit.     Plan  Diagnoses and all orders for this visit:    Second trimester pregnancy  High risk teen pregnancy in second trimester  25 weeks gestation of pregnancy  -     PTL precautions  - Normal movement discussed  - Tdap recommendation for next visit discussed  - Glucose, 1H PG; Future  -     CBC and differential; Future  -     RPR-Syphilis Screening (Total Syphilis IGG/IGM); Future  -     Anemia Panel w/Reflex, OB; Future  - Return in 4wk for PN    Nonimmune to hepatitis B virus  - Consider booster    ____________________________________________________________        Subjective    Soraya Gómez is a 19 y.o.  at 25w2d who presents for routine prenatal visit. She is without complaint. She denies contractions, loss of fluid, or vaginal bleeding. She feels regular fetal movements.     Pregnancy Problems:  Patient Active Problem List   Diagnosis    High risk teen pregnancy in second trimester    25 weeks gestation of pregnancy    Nonimmune to hepatitis B virus         Objective  /70   Wt 53.3 kg (117 lb 9.6 oz)   LMP 2024 (Approximate)   BMI 22.59 kg/m²     FHT: 144 BPM   Uterine Size: 24 cm     Physical Exam:  Physical Exam  Constitutional:       General: She is not in acute distress.     Appearance: Normal appearance. She is well-developed. She is not ill-appearing, toxic-appearing or diaphoretic.   HENT:      Head: Normocephalic and atraumatic.   Eyes:      General: No scleral icterus.        Right eye: No discharge.         Left eye: No discharge.      Conjunctiva/sclera: Conjunctivae normal.   Pulmonary:      Effort: Pulmonary effort is normal. No accessory muscle usage or respiratory distress.   Abdominal:      General: There is distension (gravid).      Tenderness: There is no abdominal tenderness. There is no guarding or rebound.   Skin:     General: Skin is warm and dry.      Coloration: Skin is not jaundiced.       Findings: No bruising, erythema or rash.   Neurological:      Mental Status: She is alert.   Psychiatric:         Mood and Affect: Mood normal.         Behavior: Behavior normal.         Thought Content: Thought content normal.         Judgment: Judgment normal.

## 2024-09-10 ENCOUNTER — ROUTINE PRENATAL (OUTPATIENT)
Dept: OBGYN CLINIC | Facility: CLINIC | Age: 19
End: 2024-09-10
Payer: COMMERCIAL

## 2024-09-10 VITALS
WEIGHT: 122 LBS | HEIGHT: 60 IN | BODY MASS INDEX: 23.95 KG/M2 | DIASTOLIC BLOOD PRESSURE: 68 MMHG | SYSTOLIC BLOOD PRESSURE: 114 MMHG

## 2024-09-10 DIAGNOSIS — Z23 ENCOUNTER FOR IMMUNIZATION: ICD-10-CM

## 2024-09-10 DIAGNOSIS — Z3A.28 28 WEEKS GESTATION OF PREGNANCY: ICD-10-CM

## 2024-09-10 DIAGNOSIS — Z34.00 SUPERVISION OF NORMAL FIRST PREGNANCY, ANTEPARTUM: Primary | ICD-10-CM

## 2024-09-10 PROCEDURE — 90471 IMMUNIZATION ADMIN: CPT | Performed by: STUDENT IN AN ORGANIZED HEALTH CARE EDUCATION/TRAINING PROGRAM

## 2024-09-10 PROCEDURE — 99213 OFFICE O/P EST LOW 20 MIN: CPT | Performed by: STUDENT IN AN ORGANIZED HEALTH CARE EDUCATION/TRAINING PROGRAM

## 2024-09-10 PROCEDURE — 90715 TDAP VACCINE 7 YRS/> IM: CPT | Performed by: STUDENT IN AN ORGANIZED HEALTH CARE EDUCATION/TRAINING PROGRAM

## 2024-09-10 NOTE — PROGRESS NOTES
"Routine Prenatal Visit  [facility] [address] [place]    Assessment/Plan:  Soraya is a 19 y.o. year old  at 28w0d who presents for routine prenatal visit.     1. Supervision of normal first pregnancy, antepartum  2. Encounter for immunization  -     Tdap Vaccine greater than or equal to 8yo  3. 28 weeks gestation of pregnancy  Assessment & Plan:  - Glucose tolerance test and CBC - encouraged  - Delivery consent discussed in detail and signed in the office today  - [facility] of [facility] providers, and fetal kick count education  - Breastfeeding education discussed- patient desires to formula feed  - Tdap given            Subjective:     CC: Prenatal care    Soraya Gómez is a 19 y.o.  female who presents for routine prenatal care at 28w0d.  Pregnancy ROS: Denies leakage of fluid, pelvic pain, or vaginal bleeding.  Reports fetal movement.    The following portions of the patient's history were reviewed and updated as appropriate: allergies, current medications, past family history, past medical history, obstetric history, gynecologic history, past social history, past surgical history and problem list.      Objective:  /68   Ht 5' 0.05\" (1.525 m)   Wt 55.3 kg (122 lb)   LMP 2024 (Approximate)   BMI 23.79 kg/m²   Pregravid Weight/BMI: 46.7 kg (103 lb) (BMI 20.09)  Current Weight: 55.3 kg (122 lb)   Total Weight Gain: 8.618 kg (19 lb)   Pre-Rani Vitals      Flowsheet Row Most Recent Value   Prenatal Assessment    Fetal Heart Rate 149   Movement Present   Prenatal Vitals    Blood Pressure 114/68   Weight - Scale 55.3 kg (122 lb)   Urine Albumin/Glucose    Dilation/Effacement/Station    Vaginal Drainage    Draining Fluid No   Edema    LLE Edema None   RLE Edema None   Facial Edema None             General: Well appearing, no distress  Respiratory: Unlabored breathing  Cardiovascular: Regular rate.  Abdomen: Soft, gravid, " nontender  Fundal Height: Appropriate for gestational age.  Extremities: Warm and well perfused.  Non tender.

## 2024-09-10 NOTE — ASSESSMENT & PLAN NOTE
- Glucose tolerance test and CBC - encouraged  - Delivery consent discussed in detail and signed in the office today  - [facility], list of [facility] providers, and fetal kick count education  - Breastfeeding education discussed- patient desires to formula feed  - Tdap given

## 2024-09-19 ENCOUNTER — APPOINTMENT (OUTPATIENT)
Dept: LAB | Facility: HOSPITAL | Age: 19
End: 2024-09-19
Payer: COMMERCIAL

## 2024-09-19 DIAGNOSIS — Z3A.25 25 WEEKS GESTATION OF PREGNANCY: ICD-10-CM

## 2024-09-19 DIAGNOSIS — Z34.92 SECOND TRIMESTER PREGNANCY: ICD-10-CM

## 2024-09-19 LAB
BASOPHILS # BLD AUTO: 0.04 THOUSANDS/ΜL (ref 0–0.1)
BASOPHILS NFR BLD AUTO: 0 % (ref 0–1)
EOSINOPHIL # BLD AUTO: 0.04 THOUSAND/ΜL (ref 0–0.61)
EOSINOPHIL NFR BLD AUTO: 0 % (ref 0–6)
ERYTHROCYTE [DISTWIDTH] IN BLOOD BY AUTOMATED COUNT: 13.1 % (ref 11.6–15.1)
GLUCOSE 1H P 50 G GLC PO SERPL-MCNC: 96 MG/DL (ref 70–134)
HCT VFR BLD AUTO: 36.6 % (ref 34.8–46.1)
HGB BLD-MCNC: 11.8 G/DL (ref 11.5–15.4)
IMM GRANULOCYTES # BLD AUTO: 0.13 THOUSAND/UL (ref 0–0.2)
IMM GRANULOCYTES NFR BLD AUTO: 1 % (ref 0–2)
LYMPHOCYTES # BLD AUTO: 1.95 THOUSANDS/ΜL (ref 0.6–4.47)
LYMPHOCYTES NFR BLD AUTO: 15 % (ref 14–44)
MCH RBC QN AUTO: 29.4 PG (ref 26.8–34.3)
MCHC RBC AUTO-ENTMCNC: 32.2 G/DL (ref 31.4–37.4)
MCV RBC AUTO: 91 FL (ref 82–98)
MONOCYTES # BLD AUTO: 0.8 THOUSAND/ΜL (ref 0.17–1.22)
MONOCYTES NFR BLD AUTO: 6 % (ref 4–12)
NEUTROPHILS # BLD AUTO: 10.26 THOUSANDS/ΜL (ref 1.85–7.62)
NEUTS SEG NFR BLD AUTO: 78 % (ref 43–75)
NRBC BLD AUTO-RTO: 0 /100 WBCS
PLATELET # BLD AUTO: 232 THOUSANDS/UL (ref 149–390)
PMV BLD AUTO: 10.7 FL (ref 8.9–12.7)
RBC # BLD AUTO: 4.02 MILLION/UL (ref 3.81–5.12)
TREPONEMA PALLIDUM IGG+IGM AB [PRESENCE] IN SERUM OR PLASMA BY IMMUNOASSAY: NORMAL
WBC # BLD AUTO: 13.22 THOUSAND/UL (ref 4.31–10.16)

## 2024-09-19 PROCEDURE — 36415 COLL VENOUS BLD VENIPUNCTURE: CPT

## 2024-09-19 PROCEDURE — 85025 COMPLETE CBC W/AUTO DIFF WBC: CPT

## 2024-09-19 PROCEDURE — 86780 TREPONEMA PALLIDUM: CPT

## 2024-09-19 PROCEDURE — 82950 GLUCOSE TEST: CPT

## 2024-09-22 PROBLEM — Z3A.30 30 WEEKS GESTATION OF PREGNANCY: Status: ACTIVE | Noted: 2024-06-26

## 2024-09-23 ENCOUNTER — ROUTINE PRENATAL (OUTPATIENT)
Dept: OBGYN CLINIC | Facility: CLINIC | Age: 19
End: 2024-09-23
Payer: COMMERCIAL

## 2024-09-23 VITALS — DIASTOLIC BLOOD PRESSURE: 72 MMHG | BODY MASS INDEX: 23.67 KG/M2 | SYSTOLIC BLOOD PRESSURE: 102 MMHG | WEIGHT: 121.4 LBS

## 2024-09-23 DIAGNOSIS — Z3A.30 30 WEEKS GESTATION OF PREGNANCY: ICD-10-CM

## 2024-09-23 DIAGNOSIS — Z34.93 THIRD TRIMESTER PREGNANCY: Primary | ICD-10-CM

## 2024-09-23 PROCEDURE — 99213 OFFICE O/P EST LOW 20 MIN: CPT | Performed by: ADVANCED PRACTICE MIDWIFE

## 2024-09-23 NOTE — ASSESSMENT & PLAN NOTE
Reviewed s/s PTL,FKC  Formula feeding  Birth plan- working on plan  normal 28 week labs  NIPS/AFP- low risk  Next visit 2 weeks

## 2024-09-23 NOTE — PROGRESS NOTES
Routine Prenatal Visit  [facility] [address] [place]    Assessment/Plan:  Soraya is a 19 y.o. year old  at 29w6d who presents for routine prenatal visit.     1. Third trimester pregnancy  2. 30 weeks gestation of pregnancy  Assessment & Plan:  Reviewed s/s PTL,FKC  Formula feeding  Birth plan- working on plan  normal 28 week labs  NIPS/AFP- low risk  Next visit 2 weeks        Subjective:     CC: Prenatal care    Soraya Gómez is a 19 y.o.  female who presents for routine prenatal care at 29w6d.  Pregnancy ROS: no leakage of fluid, pelvic pain, or vaginal bleeding.  Good fetal movement.    The following portions of the patient's history were reviewed and updated as appropriate: allergies, current medications, past family history, past medical history, obstetric history, gynecologic history, past social history, past surgical history and problem list.      Objective:  /72   Wt 55.1 kg (121 lb 6.4 oz)   LMP 2024 (Approximate)   BMI 23.67 kg/m²   Pregravid Weight/BMI: 46.7 kg (103 lb) (BMI 20.09)  Current Weight: 55.1 kg (121 lb 6.4 oz)   Total Weight Gain: 8.346 kg (18 lb 6.4 oz)   Pre-Rani Vitals      Flowsheet Row Most Recent Value   Prenatal Assessment    Fetal Heart Rate 140   Fundal Height (cm) 28 cm   Movement Present   Prenatal Vitals    Blood Pressure 102/72   Weight - Scale 55.1 kg (121 lb 6.4 oz)   Urine Albumin/Glucose    Dilation/Effacement/Station    Vaginal Drainage    Edema    LLE Edema None   RLE Edema None             General: Well appearing, no distress  Respiratory: Unlabored breathing  Cardiovascular: Regular rate.  Abdomen: Soft, gravid, nontender  Fundal Height: Appropriate for gestational age.  Extremities: Warm and well perfused.  Non tender.

## 2024-10-07 ENCOUNTER — ROUTINE PRENATAL (OUTPATIENT)
Dept: OBGYN CLINIC | Facility: CLINIC | Age: 19
End: 2024-10-07
Payer: COMMERCIAL

## 2024-10-07 VITALS
SYSTOLIC BLOOD PRESSURE: 104 MMHG | BODY MASS INDEX: 23.95 KG/M2 | DIASTOLIC BLOOD PRESSURE: 74 MMHG | HEIGHT: 60 IN | WEIGHT: 122 LBS

## 2024-10-07 DIAGNOSIS — Z34.93 THIRD TRIMESTER PREGNANCY: Primary | ICD-10-CM

## 2024-10-07 DIAGNOSIS — Z23 NEEDS FLU SHOT: ICD-10-CM

## 2024-10-07 DIAGNOSIS — Z3A.31 31 WEEKS GESTATION OF PREGNANCY: ICD-10-CM

## 2024-10-07 PROCEDURE — 90471 IMMUNIZATION ADMIN: CPT | Performed by: STUDENT IN AN ORGANIZED HEALTH CARE EDUCATION/TRAINING PROGRAM

## 2024-10-07 PROCEDURE — 99213 OFFICE O/P EST LOW 20 MIN: CPT | Performed by: STUDENT IN AN ORGANIZED HEALTH CARE EDUCATION/TRAINING PROGRAM

## 2024-10-07 PROCEDURE — 90656 IIV3 VACC NO PRSV 0.5 ML IM: CPT | Performed by: STUDENT IN AN ORGANIZED HEALTH CARE EDUCATION/TRAINING PROGRAM

## 2024-10-07 RX ORDER — PRENATAL WITH FERROUS FUM AND FOLIC ACID 3080; 920; 120; 400; 22; 1.84; 3; 20; 10; 1; 12; 200; 27; 25; 2 [IU]/1; [IU]/1; MG/1; [IU]/1; MG/1; MG/1; MG/1; MG/1; MG/1; MG/1; UG/1; MG/1; MG/1; MG/1; MG/1
1 TABLET ORAL DAILY
Qty: 90 TABLET | Refills: 1 | Status: SHIPPED | OUTPATIENT
Start: 2024-10-07

## 2024-10-07 NOTE — PROGRESS NOTES
"Routine Prenatal Visit  [facility] [address] [place]    Assessment/Plan:  Soraya is a 19 y.o. year old  at 31w6d who presents for routine prenatal visit.     1. Third trimester pregnancy  -     Prenatal 27-1 MG TABS; Take 1 tablet by mouth in the morning  2. Needs flu shot  -     influenza vaccine preservative-free 0.5 mL IM (Fluzone, Afluria, Fluarix, Flulaval)  3. 31 weeks gestation of pregnancy        Subjective:     CC: Prenatal care    Soraya Gómez is a 19 y.o.  female who presents for routine prenatal care at 31w6d.  Pregnancy ROS: Denies leakage of fluid, pelvic pain, or vaginal bleeding.  Reports normal fetal movement.    The following portions of the patient's history were reviewed and updated as appropriate: allergies, current medications, past family history, past medical history, obstetric history, gynecologic history, past social history, past surgical history and problem list.      Objective:  /74   Ht 5' 0.05\" (1.525 m)   Wt 55.3 kg (122 lb)   LMP 2024 (Approximate)   BMI 23.79 kg/m²   Pregravid Weight/BMI: 46.7 kg (103 lb) (BMI 20.09)  Current Weight: 55.3 kg (122 lb)   Total Weight Gain: 8.618 kg (19 lb)   Pre-Rani Vitals      Flowsheet Row Most Recent Value   Prenatal Assessment    Fetal Heart Rate 143   Movement Present   Prenatal Vitals    Blood Pressure 104/74   Weight - Scale 55.3 kg (122 lb)   Urine Albumin/Glucose    Dilation/Effacement/Station    Vaginal Drainage    Draining Fluid No   Edema    LLE Edema None   RLE Edema None   Facial Edema None             General: Well appearing, no distress  Respiratory: Unlabored breathing  Cardiovascular: Regular rate.  Abdomen: Soft, gravid, nontender  Fundal Height: Appropriate for gestational age.  Extremities: Warm and well perfused.  Non tender.  "

## 2024-10-08 ENCOUNTER — ULTRASOUND (OUTPATIENT)
Dept: PERINATAL CARE | Facility: OTHER | Age: 19
End: 2024-10-08
Payer: COMMERCIAL

## 2024-10-08 VITALS
BODY MASS INDEX: 22.51 KG/M2 | SYSTOLIC BLOOD PRESSURE: 96 MMHG | DIASTOLIC BLOOD PRESSURE: 60 MMHG | WEIGHT: 119.2 LBS | HEART RATE: 89 BPM | HEIGHT: 61 IN

## 2024-10-08 DIAGNOSIS — Z36.89 ENCOUNTER FOR ULTRASOUND TO ASSESS FETAL GROWTH: ICD-10-CM

## 2024-10-08 DIAGNOSIS — Z3A.32 32 WEEKS GESTATION OF PREGNANCY: Primary | ICD-10-CM

## 2024-10-08 PROCEDURE — 76816 OB US FOLLOW-UP PER FETUS: CPT | Performed by: OBSTETRICS & GYNECOLOGY

## 2024-10-08 PROCEDURE — 99213 OFFICE O/P EST LOW 20 MIN: CPT | Performed by: OBSTETRICS & GYNECOLOGY

## 2024-10-08 NOTE — LETTER
"  Date: 10/8/2024    Omaira Diaz MD  [address] [place] [address]    Patient: Soraya Gómez   YOB: 2005   Date of Visit: 10/8/2024   Gestational age 32w0d   Nature of this communication: Routine       This patient was seen recently in our  office.  Please see ultrasound report under \"OB Procedures\" tab.  Please don't hesitate to contact our office with any concerns or questions.      Sincerely,      Amara Chahal MD  Attending Physician, Maternal-Fetal Medicine  [facility]       "

## 2024-10-08 NOTE — PROGRESS NOTES
"[facility]: Soraya Gómez was seen today at 32w0d for fetal growth assessment ultrasound.  See ultrasound report under \"OB Procedures\" tab.  Please don't hesitate to contact our office with any concerns or questions.  -Amara Chahal MD    "

## 2024-10-15 ENCOUNTER — TELEPHONE (OUTPATIENT)
Dept: OBGYN CLINIC | Facility: MEDICAL CENTER | Age: 19
End: 2024-10-15

## 2024-10-15 NOTE — TELEPHONE ENCOUNTER
OB nurse navigator attempted to get in contact with patient for third trimester check-in call but unable to complete call at this time. Patient unavailable. Left message on vm to return call. Friends Aroundhart message sent.   [employer] Pregnancy And Lactation Text: This medication is Pregnancy Category B and is considered safe. There is some data to suggest avoiding during the third trimester. It is unknown if this medication is excreted in breast milk.

## 2024-10-24 ENCOUNTER — ROUTINE PRENATAL (OUTPATIENT)
Dept: OBGYN CLINIC | Facility: CLINIC | Age: 19
End: 2024-10-24
Payer: COMMERCIAL

## 2024-10-24 VITALS — WEIGHT: 121.6 LBS | BODY MASS INDEX: 23.36 KG/M2 | SYSTOLIC BLOOD PRESSURE: 104 MMHG | DIASTOLIC BLOOD PRESSURE: 76 MMHG

## 2024-10-24 DIAGNOSIS — Z34.93 THIRD TRIMESTER PREGNANCY: Primary | ICD-10-CM

## 2024-10-24 DIAGNOSIS — Z3A.34 34 WEEKS GESTATION OF PREGNANCY: ICD-10-CM

## 2024-10-24 DIAGNOSIS — Z29.11 NEED FOR RSV VACCINATION: ICD-10-CM

## 2024-10-24 DIAGNOSIS — Z78.9 NONIMMUNE TO HEPATITIS B VIRUS: ICD-10-CM

## 2024-10-24 PROCEDURE — 90678 RSV VACC PREF BIVALENT IM: CPT | Performed by: OBSTETRICS & GYNECOLOGY

## 2024-10-24 PROCEDURE — 90471 IMMUNIZATION ADMIN: CPT | Performed by: OBSTETRICS & GYNECOLOGY

## 2024-10-24 PROCEDURE — 99213 OFFICE O/P EST LOW 20 MIN: CPT | Performed by: OBSTETRICS & GYNECOLOGY

## 2024-10-24 NOTE — PROGRESS NOTES
Assessment  19 y.o.  at 34w2d presenting for routine prenatal visit.     Plan  Diagnoses and all orders for this visit:    Third trimester pregnancy  34 weeks gestation of pregnancy  - PTL precautions  - FKC  - Discussed pp contraceptive options  - Return in 2wk for PN, GBS    Nonimmune to hepatitis B virus  - Consider booster    Need for RSV vaccination  -     Respiratory Syncytial Virus (RSV) vaccine (recombinant) (Abrysvo)      ____________________________________________________________        Subjective    Soraya Gómez is a 19 y.o.  at 34w2d who presents for routine prenatal visit. She is without complaint. She denies contractions, loss of fluid, or vaginal bleeding. She feels regular fetal movements. Interested in pp larcs. Options reviewed -- post-placental IUD, nexplanon, depo. Discussed pp IUD is associated with inc risk expulsion. Questions answered and considering options.    Pregnancy Problems:  Patient Active Problem List   Diagnosis    Supervision of normal pregnancy in third trimester    34 weeks gestation of pregnancy    Nonimmune to hepatitis B virus         Objective  /76   Wt 55.2 kg (121 lb 9.6 oz)   LMP 2024 (Approximate)   BMI 23.36 kg/m²     FHT: 141 BPM   Uterine Size: 33 cm     Physical Exam:  Physical Exam  Constitutional:       General: She is not in acute distress.     Appearance: Normal appearance. She is well-developed. She is not ill-appearing, toxic-appearing or diaphoretic.   HENT:      Head: Normocephalic and atraumatic.   Eyes:      General: No scleral icterus.        Right eye: No discharge.         Left eye: No discharge.      Conjunctiva/sclera: Conjunctivae normal.   Pulmonary:      Effort: Pulmonary effort is normal. No accessory muscle usage or respiratory distress.   Abdominal:      General: There is distension (gravid).      Tenderness: There is no abdominal tenderness. There is no guarding or rebound.   Skin:     General: Skin is warm and dry.       Coloration: Skin is not jaundiced.      Findings: No bruising, erythema or rash.   Neurological:      Mental Status: She is alert.   Psychiatric:         Mood and Affect: Mood normal.         Behavior: Behavior normal.         Thought Content: Thought content normal.         Judgment: Judgment normal.

## 2024-10-24 NOTE — LETTER
October 24, 2024     Patient: Soraya Gómez  YOB: 2005  Date of Visit: 10/24/2024      To Whom it May Concern:    Soraya Gómez is under my professional care for the supervision of her pregnancy. Soraya was seen in my office on 10/24/2024.     Patient is presently 34w2d with an JULIA 12/3/24.    If you have any questions or concerns, please don't hesitate to call.         Sincerely,          Omaira Diaz MD

## 2024-10-29 ENCOUNTER — TELEPHONE (OUTPATIENT)
Dept: OBGYN CLINIC | Facility: MEDICAL CENTER | Age: 19
End: 2024-10-29

## 2024-10-29 NOTE — TELEPHONE ENCOUNTER
3RD TRIMESTER CHECK-IN CALL      Overall how are you feeling? Patient reports to be doing well.      Compliant with routine OB appointments? Yes.     Have you completed your 3rd trimester lab work? Yes. WNL.     Have you reviewed the contents of 3rd trimester folder from office? Yes.                Have you decided on a pediatrician? Would like to stay in the network. Patient's mother states they are looking into it. Information reviewed. Patient aware to let us know who she decides to go with.                             Questions on paperwork to go back to office? No.  Questions on the baby birth certificate forms? No.

## 2024-11-05 ENCOUNTER — ROUTINE PRENATAL (OUTPATIENT)
Dept: OBGYN CLINIC | Facility: CLINIC | Age: 19
End: 2024-11-05
Payer: COMMERCIAL

## 2024-11-05 VITALS — DIASTOLIC BLOOD PRESSURE: 68 MMHG | SYSTOLIC BLOOD PRESSURE: 102 MMHG | WEIGHT: 120.8 LBS | BODY MASS INDEX: 23.2 KG/M2

## 2024-11-05 DIAGNOSIS — Z34.03 ENCOUNTER FOR SUPERVISION OF NORMAL FIRST PREGNANCY IN THIRD TRIMESTER: ICD-10-CM

## 2024-11-05 DIAGNOSIS — Z78.9 NONIMMUNE TO HEPATITIS B VIRUS: Primary | ICD-10-CM

## 2024-11-05 DIAGNOSIS — Z3A.36 36 WEEKS GESTATION OF PREGNANCY: ICD-10-CM

## 2024-11-05 PROCEDURE — 87150 DNA/RNA AMPLIFIED PROBE: CPT | Performed by: ADVANCED PRACTICE MIDWIFE

## 2024-11-05 PROCEDURE — 99213 OFFICE O/P EST LOW 20 MIN: CPT | Performed by: ADVANCED PRACTICE MIDWIFE

## 2024-11-05 NOTE — PROGRESS NOTES
Routine Prenatal Visit  [facility] [address] [place]    Assessment/Plan:  Soraya is a 19 y.o. year old  at 36w0d who presents for routine prenatal visit.     1. Nonimmune to hepatitis B virus  2. Encounter for supervision of normal first pregnancy in third trimester  3. 36 weeks gestation of pregnancy  Assessment & Plan:  - reviewed s/s labor, FKC,   - formula feeding  - NIPS/AFP- low risk  - GBS today  - vaccines up to date  - next visit 1 week  Orders:  -     Strep B DNA probe, amplification        Subjective:     CC: Prenatal care    Soraya Gómez is a 19 y.o.  female who presents for routine prenatal care at 36w0d.  Pregnancy ROS: no leakage of fluid, pelvic pain, or vaginal bleeding.  Good fetal movement. Notes that she is eating well and making good food choices.     The following portions of the patient's history were reviewed and updated as appropriate: allergies, current medications, past family history, past medical history, obstetric history, gynecologic history, past social history, past surgical history and problem list.      Objective:  /68   Wt 54.8 kg (120 lb 12.8 oz)   LMP 2024 (Approximate)   BMI 23.20 kg/m²   Pregravid Weight/BMI: 46.7 kg (103 lb) (BMI 19.78)  Current Weight: 54.8 kg (120 lb 12.8 oz)   Total Weight Gain: 8.074 kg (17 lb 12.8 oz)   Pre-Rani Vitals      Flowsheet Row Most Recent Value   Prenatal Assessment    Fetal Heart Rate 140   Movement Present   Prenatal Vitals    Blood Pressure 102/68   Weight - Scale 54.8 kg (120 lb 12.8 oz)   Urine Albumin/Glucose    Dilation/Effacement/Station    Vaginal Drainage    Edema    LLE Edema None   RLE Edema None             General: Well appearing, no distress  Respiratory: Unlabored breathing  Cardiovascular: Regular rate.  Abdomen: Soft, gravid, nontender  Fundal Height: Appropriate for gestational age.  Extremities: Warm and well perfused.  Non tender.

## 2024-11-05 NOTE — ASSESSMENT & PLAN NOTE
- reviewed s/s labor, FKC,   - formula feeding  - NIPS/AFP- low risk  - GBS today  - vaccines up to date  - next visit 1 week

## 2024-11-07 LAB — GP B STREP DNA SPEC QL NAA+PROBE: NEGATIVE

## 2024-11-15 ENCOUNTER — ROUTINE PRENATAL (OUTPATIENT)
Dept: OBGYN CLINIC | Facility: CLINIC | Age: 19
End: 2024-11-15
Payer: COMMERCIAL

## 2024-11-15 VITALS — DIASTOLIC BLOOD PRESSURE: 74 MMHG | BODY MASS INDEX: 23.55 KG/M2 | WEIGHT: 122.6 LBS | SYSTOLIC BLOOD PRESSURE: 104 MMHG

## 2024-11-15 DIAGNOSIS — Z34.93 THIRD TRIMESTER PREGNANCY: Primary | ICD-10-CM

## 2024-11-15 DIAGNOSIS — Z78.9 NONIMMUNE TO HEPATITIS B VIRUS: ICD-10-CM

## 2024-11-15 DIAGNOSIS — Z3A.37 37 WEEKS GESTATION OF PREGNANCY: ICD-10-CM

## 2024-11-15 PROCEDURE — 99213 OFFICE O/P EST LOW 20 MIN: CPT | Performed by: OBSTETRICS & GYNECOLOGY

## 2024-11-15 NOTE — PROGRESS NOTES
Assessment  19 y.o.  at 37w3d presenting for routine prenatal visit.     Plan  Diagnoses and all orders for this visit:    Third trimester pregnancy  37 weeks gestation of pregnancy  - Labor precautions  - C  - Discussed eIOL vs expectant mgmt. Undecided, will consider  - Return in 1wk for PN    Nonimmune to hepatitis B virus  - Consider booster    ____________________________________________________________        Subjective    Soraya Gómez is a 19 y.o.  at 37w3d who presents for routine prenatal visit. She is without complaint. She denies contractions, loss of fluid, or vaginal bleeding. She feels regular fetal movements.     Pregnancy Problems:  Patient Active Problem List   Diagnosis    Supervision of normal pregnancy in third trimester    37 weeks gestation of pregnancy    Nonimmune to hepatitis B virus         Objective  /74   Wt 55.6 kg (122 lb 9.6 oz)   LMP 2024 (Approximate)   BMI 23.55 kg/m²     FHT: 146 BPM   Uterine Size: size equals dates     Physical Exam:  Physical Exam  Constitutional:       General: She is not in acute distress.     Appearance: Normal appearance. She is well-developed. She is not ill-appearing, toxic-appearing or diaphoretic.   HENT:      Head: Normocephalic and atraumatic.   Eyes:      General: No scleral icterus.        Right eye: No discharge.         Left eye: No discharge.      Conjunctiva/sclera: Conjunctivae normal.   Pulmonary:      Effort: Pulmonary effort is normal. No accessory muscle usage or respiratory distress.   Abdominal:      General: There is distension (gravid).      Tenderness: There is no abdominal tenderness. There is no guarding or rebound.   Skin:     General: Skin is warm and dry.      Coloration: Skin is not jaundiced.      Findings: No bruising, erythema or rash.   Neurological:      Mental Status: She is alert.   Psychiatric:         Mood and Affect: Mood normal.         Behavior: Behavior normal.         Thought Content:  Thought content normal.         Judgment: Judgment normal.

## 2024-11-19 ENCOUNTER — ROUTINE PRENATAL (OUTPATIENT)
Dept: OBGYN CLINIC | Facility: CLINIC | Age: 19
End: 2024-11-19
Payer: COMMERCIAL

## 2024-11-19 VITALS
BODY MASS INDEX: 23.03 KG/M2 | WEIGHT: 122 LBS | SYSTOLIC BLOOD PRESSURE: 108 MMHG | HEIGHT: 61 IN | DIASTOLIC BLOOD PRESSURE: 76 MMHG

## 2024-11-19 DIAGNOSIS — Z3A.38 38 WEEKS GESTATION OF PREGNANCY: ICD-10-CM

## 2024-11-19 DIAGNOSIS — Z34.03 ENCOUNTER FOR SUPERVISION OF NORMAL FIRST PREGNANCY IN THIRD TRIMESTER: Primary | ICD-10-CM

## 2024-11-19 PROCEDURE — 99214 OFFICE O/P EST MOD 30 MIN: CPT | Performed by: STUDENT IN AN ORGANIZED HEALTH CARE EDUCATION/TRAINING PROGRAM

## 2024-11-19 NOTE — PROGRESS NOTES
"Routine Prenatal Visit  [facility] [address] [place]    Assessment/Plan:  Soraya is a 19 y.o. year old  at 38w0d who presents for routine prenatal visit.     1. Encounter for supervision of normal first pregnancy in third trimester  2. 38 weeks gestation of pregnancy  Assessment & Plan:  - GBS negative  - Delivery Plan: Desires to await spontaneous labor  - Feeding: formula feeding   - Immunizations: s/p Tdap, flu, and RSV  - Pediatrician: [facility]  - Postpartum Contraception: Undecided  - Perineal massage education reviewed  - Fetal kick counts and labor precautions reviewed.            Subjective:     CC: Prenatal care    Soraya Gómez is a 19 y.o.  female who presents for routine prenatal care at 38w0d.  Pregnancy ROS: Denies leakage of fluid, pelvic pain, or vaginal bleeding.  Reports fetal movement.    The following portions of the patient's history were reviewed and updated as appropriate: allergies, current medications, past family history, past medical history, obstetric history, gynecologic history, past social history, past surgical history and problem list.      Objective:  /76   Ht 5' 0.5\" (1.537 m)   Wt 55.3 kg (122 lb)   LMP 2024 (Approximate)   BMI 23.43 kg/m²   Pregravid Weight/BMI: 46.7 kg (103 lb) (BMI 19.78)  Current Weight: 55.3 kg (122 lb)   Total Weight Gain: 8.618 kg (19 lb)   Pre- Vitals      Flowsheet Row Most Recent Value   Prenatal Assessment    Fetal Heart Rate 134   Fundal Height (cm) 35 cm   Movement Present   Prenatal Vitals    Blood Pressure 108/76   Weight - Scale 55.3 kg (122 lb)   Urine Albumin/Glucose    Dilation/Effacement/Station    Vaginal Drainage    Draining Fluid No   Edema    LLE Edema None   RLE Edema None   Facial Edema None             General: Well appearing, no distress  Respiratory: Unlabored breathing  Cardiovascular: Regular rate.  Abdomen: Soft, gravid, nontender  Fundal Height: " Appropriate for gestational age.  Extremities: Warm and well perfused.  Non tender.

## 2024-11-19 NOTE — ASSESSMENT & PLAN NOTE
- GBS negative  - Delivery Plan: Desires to await spontaneous labor  - Feeding: formula feeding   - Immunizations: s/p Tdap, flu, and RSV  - Pediatrician: [facility]  - Postpartum Contraception: Undecided  - Perineal massage education reviewed  - Fetal kick counts and labor precautions reviewed.

## 2024-11-26 ENCOUNTER — ROUTINE PRENATAL (OUTPATIENT)
Dept: OBGYN CLINIC | Facility: CLINIC | Age: 19
End: 2024-11-26
Payer: COMMERCIAL

## 2024-11-26 VITALS
BODY MASS INDEX: 20.29 KG/M2 | DIASTOLIC BLOOD PRESSURE: 74 MMHG | WEIGHT: 121.8 LBS | SYSTOLIC BLOOD PRESSURE: 110 MMHG | HEIGHT: 65 IN

## 2024-11-26 DIAGNOSIS — Z34.03 ENCOUNTER FOR SUPERVISION OF NORMAL FIRST PREGNANCY IN THIRD TRIMESTER: Primary | ICD-10-CM

## 2024-11-26 DIAGNOSIS — Z3A.39 39 WEEKS GESTATION OF PREGNANCY: ICD-10-CM

## 2024-11-26 PROCEDURE — 99213 OFFICE O/P EST LOW 20 MIN: CPT | Performed by: STUDENT IN AN ORGANIZED HEALTH CARE EDUCATION/TRAINING PROGRAM

## 2024-11-26 NOTE — PROGRESS NOTES
"Routine Prenatal Visit  [facility] [address] [place]    Assessment/Plan:  Soraya is a 19 y.o. year old  at 39w0d who presents for routine prenatal visit.     1. Encounter for supervision of normal first pregnancy in third trimester  2. 39 weeks gestation of pregnancy  Assessment & Plan:  - GBS negative  - Delivery Plan: Desires to await spontaneous labor  - Feeding: formula feeding   - Immunizations: s/p Tdap, flu, and RSV  - Pediatrician: [facility]  - Postpartum Contraception: Undecided  - Perineal massage education reviewed  - Fetal kick counts and labor precautions reviewed.          Subjective:     CC: Prenatal care    Soraya Gómez is a 19 y.o.  female who presents for routine prenatal care at 39w0d.  Pregnancy ROS: Denies leakage of fluid, pelvic pain, or vaginal bleeding.  Reports normal fetal movement.    The following portions of the patient's history were reviewed and updated as appropriate: allergies, current medications, past family history, past medical history, obstetric history, gynecologic history, past social history, past surgical history and problem list.      Objective:  /74   Ht 5' 5\" (1.651 m)   Wt 55.2 kg (121 lb 12.8 oz)   LMP 2024 (Approximate)   BMI 20.27 kg/m²   Pregravid Weight/BMI: 46.7 kg (103 lb) (BMI 17.14)  Current Weight: 55.2 kg (121 lb 12.8 oz)   Total Weight Gain: 8.528 kg (18 lb 12.8 oz)   Pre- Vitals      Flowsheet Row Most Recent Value   Prenatal Assessment    Fetal Heart Rate 137   Movement Present   Prenatal Vitals    Blood Pressure 110/74   Weight - Scale 55.2 kg (121 lb 12.8 oz)   Urine Albumin/Glucose    Dilation/Effacement/Station    Cervical Dilation 1   Cervical Effacement 50   Fetal Station -3   Vaginal Drainage    Draining Fluid No   Edema    LLE Edema None   RLE Edema None   Facial Edema None             General: Well appearing, no distress  Respiratory: Unlabored " breathing  Cardiovascular: Regular rate.  Abdomen: Soft, gravid, nontender  Fundal Height: Appropriate for gestational age.  Extremities: Warm and well perfused.  Non tender.    Mary Durant MD  [facility]  [facility]  11/26/2024 1:45 PM

## 2024-11-28 ENCOUNTER — HOSPITAL ENCOUNTER (INPATIENT)
Facility: HOSPITAL | Age: 19
LOS: 1 days | Discharge: HOME | End: 2024-11-29
Attending: OBSTETRICS & GYNECOLOGY | Admitting: OBSTETRICS & GYNECOLOGY
Payer: COMMERCIAL

## 2024-11-28 ENCOUNTER — ANESTHESIA (INPATIENT)
Dept: ANESTHESIOLOGY | Facility: HOSPITAL | Age: 19
End: 2024-11-28
Payer: COMMERCIAL

## 2024-11-28 ENCOUNTER — NURSE TRIAGE (OUTPATIENT)
Dept: OTHER | Facility: OTHER | Age: 19
End: 2024-11-28

## 2024-11-28 ENCOUNTER — ANESTHESIA EVENT (INPATIENT)
Dept: ANESTHESIOLOGY | Facility: HOSPITAL | Age: 19
End: 2024-11-28
Payer: COMMERCIAL

## 2024-11-28 DIAGNOSIS — O42.90 LEAKAGE OF AMNIOTIC FLUID: ICD-10-CM

## 2024-11-28 DIAGNOSIS — Z3A.39 39 WEEKS GESTATION OF PREGNANCY: Primary | ICD-10-CM

## 2024-11-28 PROBLEM — M41.9 SCOLIOSIS: Status: ACTIVE | Noted: 2024-11-28

## 2024-11-28 LAB
ABO GROUP BLD: NORMAL
BASE EXCESS BLDCOA CALC-SCNC: -3.8 MMOL/L (ref 3–11)
BASE EXCESS BLDCOV CALC-SCNC: -2.6 MMOL/L (ref 1–9)
BLD GP AB SCN SERPL QL: NEGATIVE
ERYTHROCYTE [DISTWIDTH] IN BLOOD BY AUTOMATED COUNT: 13.2 % (ref 11.6–15.1)
HCO3 BLDCOA-SCNC: 24.2 MMOL/L (ref 17.3–27.3)
HCO3 BLDCOV-SCNC: 22.4 MMOL/L (ref 12.2–28.6)
HCT VFR BLD AUTO: 35.1 % (ref 34.8–46.1)
HGB BLD-MCNC: 12 G/DL (ref 11.5–15.4)
HOLD SPECIMEN: YES
MCH RBC QN AUTO: 29.6 PG (ref 26.8–34.3)
MCHC RBC AUTO-ENTMCNC: 34.2 G/DL (ref 31.4–37.4)
MCV RBC AUTO: 87 FL (ref 82–98)
O2 CT VFR BLDCOA CALC: 6.5 ML/DL
OXYHGB MFR BLDCOA: 29.3 %
OXYHGB MFR BLDCOV: 65.1 %
PCO2 BLDCOA: 54.8 MM[HG] (ref 30–60)
PCO2 BLDCOV: 40 MM HG (ref 27–43)
PH BLDCOA: 7.26 [PH] (ref 7.23–7.43)
PH BLDCOV: 7.37 [PH] (ref 7.19–7.49)
PLATELET # BLD AUTO: 234 THOUSANDS/UL (ref 149–390)
PMV BLD AUTO: 10.9 FL (ref 8.9–12.7)
PO2 BLDCOA: 14.8 MM HG (ref 5–25)
PO2 BLDCOV: 25.5 MM HG (ref 15–45)
RBC # BLD AUTO: 4.05 MILLION/UL (ref 3.81–5.12)
RH BLD: POSITIVE
SAO2 % BLDCOV: 13.8 ML/DL
SPECIMEN EXPIRATION DATE: NORMAL
TREPONEMA PALLIDUM IGG+IGM AB [PRESENCE] IN SERUM OR PLASMA BY IMMUNOASSAY: NORMAL
WBC # BLD AUTO: 12.32 THOUSAND/UL (ref 4.31–10.16)

## 2024-11-28 PROCEDURE — 4A1HXCZ MONITORING OF PRODUCTS OF CONCEPTION, CARDIAC RATE, EXTERNAL APPROACH: ICD-10-PCS | Performed by: OBSTETRICS & GYNECOLOGY

## 2024-11-28 PROCEDURE — 0UH97HZ INSERTION OF CONTRACEPTIVE DEVICE INTO UTERUS, VIA NATURAL OR ARTIFICIAL OPENING: ICD-10-PCS | Performed by: OBSTETRICS & GYNECOLOGY

## 2024-11-28 PROCEDURE — 86901 BLOOD TYPING SEROLOGIC RH(D): CPT

## 2024-11-28 PROCEDURE — 86900 BLOOD TYPING SEROLOGIC ABO: CPT

## 2024-11-28 PROCEDURE — 85027 COMPLETE CBC AUTOMATED: CPT

## 2024-11-28 PROCEDURE — NC001 PR NO CHARGE: Performed by: OBSTETRICS & GYNECOLOGY

## 2024-11-28 PROCEDURE — 86780 TREPONEMA PALLIDUM: CPT

## 2024-11-28 PROCEDURE — 0KQM0ZZ REPAIR PERINEUM MUSCLE, OPEN APPROACH: ICD-10-PCS | Performed by: OBSTETRICS & GYNECOLOGY

## 2024-11-28 PROCEDURE — 86850 RBC ANTIBODY SCREEN: CPT

## 2024-11-28 PROCEDURE — 58300 INSERT INTRAUTERINE DEVICE: CPT | Performed by: OBSTETRICS & GYNECOLOGY

## 2024-11-28 PROCEDURE — 59409 OBSTETRICAL CARE: CPT | Performed by: OBSTETRICS & GYNECOLOGY

## 2024-11-28 PROCEDURE — 99203 OFFICE O/P NEW LOW 30 MIN: CPT

## 2024-11-28 PROCEDURE — 82805 BLOOD GASES W/O2 SATURATION: CPT | Performed by: OBSTETRICS & GYNECOLOGY

## 2024-11-28 RX ORDER — IBUPROFEN 600 MG/1
600 TABLET, FILM COATED ORAL EVERY 6 HOURS
Status: DISCONTINUED | OUTPATIENT
Start: 2024-11-28 | End: 2024-11-29 | Stop reason: HOSPADM

## 2024-11-28 RX ORDER — ONDANSETRON 2 MG/ML
4 INJECTION INTRAMUSCULAR; INTRAVENOUS EVERY 6 HOURS PRN
Status: DISCONTINUED | OUTPATIENT
Start: 2024-11-28 | End: 2024-11-28

## 2024-11-28 RX ORDER — ROPIVACAINE HYDROCHLORIDE 2 MG/ML
INJECTION, SOLUTION EPIDURAL; INFILTRATION; PERINEURAL CONTINUOUS PRN
Status: DISCONTINUED | OUTPATIENT
Start: 2024-11-28 | End: 2024-11-28 | Stop reason: HOSPADM

## 2024-11-28 RX ORDER — DOCUSATE SODIUM 100 MG/1
100 CAPSULE, LIQUID FILLED ORAL 2 TIMES DAILY
Status: DISCONTINUED | OUTPATIENT
Start: 2024-11-28 | End: 2024-11-29 | Stop reason: HOSPADM

## 2024-11-28 RX ORDER — BUPIVACAINE HYDROCHLORIDE 7.5 MG/ML
INJECTION, SOLUTION INTRASPINAL AS NEEDED
Status: DISCONTINUED | OUTPATIENT
Start: 2024-11-28 | End: 2024-11-28 | Stop reason: HOSPADM

## 2024-11-28 RX ORDER — OXYTOCIN/RINGER'S LACTATE 30/500 ML
PLASTIC BAG, INJECTION (ML) INTRAVENOUS
Status: COMPLETED
Start: 2024-11-28 | End: 2024-11-28

## 2024-11-28 RX ORDER — BENZOCAINE/MENTHOL 6 MG-10 MG
1 LOZENGE MUCOUS MEMBRANE DAILY PRN
Status: DISCONTINUED | OUTPATIENT
Start: 2024-11-28 | End: 2024-11-29 | Stop reason: HOSPADM

## 2024-11-28 RX ORDER — ONDANSETRON 2 MG/ML
4 INJECTION INTRAMUSCULAR; INTRAVENOUS EVERY 8 HOURS PRN
Status: DISCONTINUED | OUTPATIENT
Start: 2024-11-28 | End: 2024-11-29 | Stop reason: HOSPADM

## 2024-11-28 RX ORDER — ACETAMINOPHEN 325 MG/1
650 TABLET ORAL EVERY 4 HOURS PRN
Status: DISCONTINUED | OUTPATIENT
Start: 2024-11-28 | End: 2024-11-29 | Stop reason: HOSPADM

## 2024-11-28 RX ORDER — OXYTOCIN/RINGER'S LACTATE 30/500 ML
250 PLASTIC BAG, INJECTION (ML) INTRAVENOUS ONCE
Status: COMPLETED | OUTPATIENT
Start: 2024-11-28 | End: 2024-11-28

## 2024-11-28 RX ORDER — CALCIUM CARBONATE 500 MG/1
1000 TABLET, CHEWABLE ORAL DAILY PRN
Status: DISCONTINUED | OUTPATIENT
Start: 2024-11-28 | End: 2024-11-29 | Stop reason: HOSPADM

## 2024-11-28 RX ORDER — BUPIVACAINE HYDROCHLORIDE 2.5 MG/ML
30 INJECTION, SOLUTION EPIDURAL; INFILTRATION; INTRACAUDAL ONCE AS NEEDED
Status: DISCONTINUED | OUTPATIENT
Start: 2024-11-28 | End: 2024-11-28

## 2024-11-28 RX ORDER — DIPHENHYDRAMINE HCL 25 MG
25 TABLET ORAL EVERY 6 HOURS PRN
Status: DISCONTINUED | OUTPATIENT
Start: 2024-11-28 | End: 2024-11-29 | Stop reason: HOSPADM

## 2024-11-28 RX ORDER — SODIUM CHLORIDE, SODIUM LACTATE, POTASSIUM CHLORIDE, CALCIUM CHLORIDE 600; 310; 30; 20 MG/100ML; MG/100ML; MG/100ML; MG/100ML
125 INJECTION, SOLUTION INTRAVENOUS CONTINUOUS
Status: DISCONTINUED | OUTPATIENT
Start: 2024-11-28 | End: 2024-11-29 | Stop reason: HOSPADM

## 2024-11-28 RX ORDER — SIMETHICONE 80 MG
80 TABLET,CHEWABLE ORAL 4 TIMES DAILY PRN
Status: DISCONTINUED | OUTPATIENT
Start: 2024-11-28 | End: 2024-11-29 | Stop reason: HOSPADM

## 2024-11-28 RX ADMIN — LEVONORGESTREL 1 INTRA UTERINE DEVICE: 52 INTRAUTERINE DEVICE INTRAUTERINE at 11:49

## 2024-11-28 RX ADMIN — ROPIVACAINE HYDROCHLORIDE 10 ML/HR: 2 INJECTION EPIDURAL; INFILTRATION; PERINEURAL at 08:47

## 2024-11-28 RX ADMIN — SODIUM CHLORIDE, SODIUM LACTATE, POTASSIUM CHLORIDE, AND CALCIUM CHLORIDE 999 ML/HR: .6; .31; .03; .02 INJECTION, SOLUTION INTRAVENOUS at 09:00

## 2024-11-28 RX ADMIN — IBUPROFEN 600 MG: 600 TABLET, FILM COATED ORAL at 23:45

## 2024-11-28 RX ADMIN — IBUPROFEN 600 MG: 600 TABLET, FILM COATED ORAL at 17:01

## 2024-11-28 RX ADMIN — Medication 250 MILLI-UNITS/MIN: at 11:45

## 2024-11-28 RX ADMIN — BUPIVACAINE HYDROCHLORIDE IN DEXTROSE 0.3 ML: 7.5 INJECTION, SOLUTION SUBARACHNOID at 08:43

## 2024-11-28 RX ADMIN — ROPIVACAINE HYDROCHLORIDE: 2 INJECTION, SOLUTION EPIDURAL; INFILTRATION at 09:44

## 2024-11-28 RX ADMIN — ACETAMINOPHEN 650 MG: 325 TABLET, FILM COATED ORAL at 19:18

## 2024-11-28 RX ADMIN — BENZOCAINE AND LEVOMENTHOL 1 APPLICATION: 200; 5 SPRAY TOPICAL at 17:01

## 2024-11-28 RX ADMIN — DOCUSATE SODIUM 100 MG: 100 CAPSULE, LIQUID FILLED ORAL at 17:01

## 2024-11-28 RX ADMIN — ACETAMINOPHEN 650 MG: 325 TABLET, FILM COATED ORAL at 23:45

## 2024-11-28 RX ADMIN — SODIUM CHLORIDE, SODIUM LACTATE, POTASSIUM CHLORIDE, AND CALCIUM CHLORIDE 125 ML/HR: .6; .31; .03; .02 INJECTION, SOLUTION INTRAVENOUS at 07:10

## 2024-11-28 NOTE — ANESTHESIA PREPROCEDURE EVALUATION
Procedure:  LABOR ANALGESIA    Relevant Problems   GYN   (+) 39 weeks gestation of pregnancy   (+) Supervision of normal pregnancy in third trimester      MUSCULOSKELETAL   (+) Scoliosis (marked)      Obstetrics/Gynecology   (+) Amniotic fluid leaking      Other   (+) Nonimmune to hepatitis B virus        Physical Exam    Airway    Mallampati score: II         Dental   No notable dental hx     Cardiovascular  Cardiovascular exam normal    Pulmonary  Pulmonary exam normal     Other Findings  Nose ear piercing inpost-pubertal.      Anesthesia Plan  ASA Score- 2     Anesthesia Type- epidural with ASA Monitors.         Additional Monitors:     Airway Plan:            Plan Factors-Exercise tolerance (METS): >4 METS.    Chart reviewed.   Existing labs reviewed.     Patient is not a current smoker.              Induction-     Postoperative Plan-     Perioperative Resuscitation Plan - Level 1 - Full Code.       Informed Consent- Anesthetic plan and risks discussed with patient.

## 2024-11-28 NOTE — DISCHARGE SUMMARY
Discharge Summary - Soraya Gómez 19 y.o. female MRN: [id number]    Unit/Bed#: -01 Encounter: [id number]    Admission Date: 2024     Discharge Date: 24    Admitting Attending: Daina Mo DO   Delivering Attending: Donna Akers MD   Discharge Attending: Dr. Prabha Horan    Diagnosis:   1. Pregnancy at 39 weeks of gestation   2. Non-immune to hepatitis B virus   3. Amniotic fluid leaking    4. Delivery of a term     Procedures: Spontaneous Vaginal Delivery, repair of second degree laceration, post placental mirena placement     Complications: none apparent    Hospital Course: Ms. Soraya Gómez is a 19 y.o.  who presented at 39wk2d after experiencing rupture membranes at home for clear fluid.  On arrival to labor and delivery she was noted to be 3-4/80/-1.  She received an epidural for analgesia.  She progressed to complete and began pushing.  During the pushing phase of labor contractions were noted to spaced to every 5 to 7 minutes.  A low-dose Pitocin titration was started to aid in contraction frequency and supplement maternal efforts.  Her tracing was intermittently category 2 with variable decelerations.  She delivered via  with a second degree laceration. A post placental Mirena was placed.  She had an uncomplicated postpartum course.     Condition at discharge: stable     On day of discharge, patient was tolerating PO, passing flatus, urinating, and ambulating. Her pain was well controlled with oral analgesics. She was discharged home on postpartum day #1 with standard post partum instructions to follow up with her physician in 3-6 weeks for a postpartum appointment.     Discharge instructions/Information to patient and family:   - Do not place anything (no partner, tampons or douche) in your vagina for 6 weeks.  -You may walk for exercise for the first 6 weeks then gradually return to your usual activities.   -Please do not drive for 1 week if you have no stitches  and for 2 weeks if you have stitches or underwent a  delivery.    -You may take baths or shower per your preference.   -Please look at your bust (breasts) in the mirror daily and call for redness or tenderness or increased warmth.   -Please call us for temperature > 100.4*F or 38* C, worsening pain or a foul discharge.      Discharge Medications:   Prenatal vitamin daily for 6 months or the duration of nursing whichever is longer.  Motrin 600 mg orally every 6 hours as needed for pain  Tylenol (over the counter) per bottle directions as needed for pain: do NOT use with percocet  Hydrocortisone cream 1% (over the counter) applied 1-2x daily to hemorrhoids as needed    Provisions for Follow-Up Care:  Follow up with your doctor in 3-6 weeks for postpartum care.     Planned Readmission: Shy Crawford MD   PGY-2, OBGYN  2024  6:59 AM

## 2024-11-28 NOTE — TELEPHONE ENCOUNTER
"Reason for Disposition   Leakage of fluid from vagina    Answer Assessment - Initial Assessment Questions  1. ONSET: \"When did the symptoms begin?\"         A few minutes ago started having water leak    2. CONTRACTIONS: \"Are you having any contractions?\" If yes, ask: \"Describe the contractions that you are having.\" (e.g., duration, frequency, regularity, severity)      \"Yes, she's not sure    3. JULIA: \"What date are you expecting to deliver?\"      12/3/24    4. PARITY: \"Have you had a baby before?\" If Yes, ask: \"How long did the labor last?\"      First pregnancy    5. FETAL MOVEMENT: \"Has the baby's movement decreased or changed significantly from normal?\"      Normal     6. OTHER SYMPTOMS: \"Do you have any other symptoms?\" (e.g., abdominal pain, vaginal bleeding, fever, hand/facial swelling)      Denies    39w2d.    Protocols used: Pregnancy - Rupture of Membranes-ADULT-AH    "

## 2024-11-28 NOTE — TELEPHONE ENCOUNTER
[facility] messages sent to on-call provider and [facility] L&D charge nurse to make aware of patient's symptoms and arrival in approximately 30 minutes.

## 2024-11-28 NOTE — L&D DELIVERY NOTE
Vaginal Delivery Summary - OB/GYN   Soraya Gómez 19 y.o. female MRN: [id number]  Unit/Bed#: -01 Encounter: [id number]          Predelivery Diagnosis:  1. Pregnancy at 39 weeks of gestation   2. Non-immune to hepatitis B virus   3. Amniotic fluid leaking      Postdelivery Diagnosis:  1. Delivery of term   2. Non-Immune to hepatitis B     Procedure: Spontaneous Vaginal Delivery, repair of second degree laceration, post placental mirena placement     Attending: Dr. Akers    Assistant: Dr. Olivas     Anesthesia: Epidural    QBL: 25 cc  Admission H.0 g/dL  Admission platelets: 234k    Complications: none apparent    Specimens: cord blood, arterial and venous cord blood gasses, placenta to storage    Findings:   1. Viable male at 1143, weighing 6lb 12oz (3062g), with APGARS of 7 and 9 at 1 and 5 minutes respectively,   2. Spontaneous delivery of intact placenta   3. 2 degree laceration repaired with 2-0 vicryl rapide.  4. Mirena placed post delivery - lot # [id number] Exp - 2026  5. Blood gases:    Umbilical Cord Venous Blood Gas:  Results from last 7 days   Lab Units 24  1145   PH COV  7.367   PCO2 COV mm HG 40.0   HCO3 COV mmol/L 22.4   BASE EXC COV mmol/L -2.6*   O2 CT CD VB mL/dL 13.8   O2 HGB, VENOUS CORD % 65.1     Umbilical Cord Arterial Blood Gas:  Results from last 7 days   Lab Units 24  1145   PH COA  7.262   PCO2 COA  54.8   PO2 COA mm HG 14.8   HCO3 COA mmol/L 24.2   BASE EXC COA mmol/L -3.8*   O2 CONTENT CORD ART ml/dl 6.5   O2 HGB, ARTERIAL CORD % 29.3       Disposition:  Patient tolerated the procedure well and was recovering in labor and delivery room     Brief history and labor course:  Ms. Soraya Gómez is a 19 y.o.  who presented at 39wk2d after experiencing rupture membranes at home for clear fluid.  On arrival to labor and delivery she was noted to be 3-4/80/-1.  She received an epidural for analgesia.  She progressed to complete and began pushing.  During the  pushing phase of labor contractions were noted to spaced to every 5 to 7 minutes.  A low-dose Pitocin titration was started to aid in contraction frequency and supplement maternal efforts.  Her tracing was intermittently category 2 with variable decelerations.    Description of procedure    After pushing for 124 minutes, at 1143 patient delivered a viable male , weighing 6lb 12oz (3062 g), apgars of 7 (1 min) and 9 (5 min). The fetal vertex delivered spontaneously and restituted MAURICIO. Baby was checked for nuchal. No nuchal cord was noted. The anterior shoulder delivered atraumatically with maternal expulsive forces and the assistance of downward traction. The posterior shoulder delivered with maternal expulsive forces and the assistance of upward traction. The remainder of the fetus delivered spontaneously.     Upon delivery, the infant was placed on the mothers abdomen and the cord was clamped and cut. Delayed cord clamping was performed.  The infant was noted to cry spontaneously and was moving all extremities appropriately. There was no evidence for injury. Awaiting nurse resuscitators evaluated the . Arterial and venous cord blood gases and cord blood was collected for analysis. These were promptly sent to the lab. In the immediate post-partum, 30 units of IV pitocin was administered, and the uterus was noted to contract down well with massage and pitocin. The placenta delivered spontaneously at 1147 and was noted to have a centrally inserted 3 vessel cord.     Straight catheterization was performed for 700 cc of clear urine.     IUD was removed from insertion tube, placed in physician hand and advanced to uterine fundus. Strings were then trimmed.    The vagina, cervix, perineum, and rectum were inspected and there was noted to be a second degree perineal laceration. The vaginal mucosa and submucosa were then re approximated using 2-0 vicryl rapide to the point of the hymenal ring. The stitch was  brought underneath and the underlying musculature was reapproximated using transverse stitches. The superficial perineal fascia was then re approximated using 2-0 vicryl in a running non locked stitch downward followed by a running subcuticular stitch upward to the level of the introitus.        At the conclusion of the procedure, all needle, sponge, and instrument counts were noted to be correct. Patient tolerated the procedure well and was allowed to recover in labor and delivery room with family and  before being transferred to the post-partum floor. Dr. Akers was present and participated in all key portions of the case.        Rosalina Olivas MD  2024  1:09 PM

## 2024-11-28 NOTE — OB LABOR/OXYTOCIN SAFETY PROGRESS
Labor Progress Note - Soraya Gómez 19 y.o. female MRN: [id number]    Unit/Bed#: -01 Encounter: [id number]                Cervical Dilation: 10  Dilation Complete Date: 11/28/24  Dilation Complete Time: 0925  Cervical Effacement: 100  Fetal Station: 2     Fetal Heart Rate (FHT): 135 BPM  FHR Category: 2               Vital Signs:   Vitals:    11/28/24 0910   BP: 93/50   Pulse: 81   Resp:    Temp:        Notes/comments:   Occasional late/variable decelerations noted. SVE 10/100/+2. Will plan to begin pushing. Dr. Delphine chopra.       Rosalina Olivas MD 11/28/2024 9:30 AM

## 2024-11-28 NOTE — ANESTHESIA PROCEDURE NOTES
CSE Block    Patient location during procedure: OB  Start time: 11/28/2024 8:43 AM  Staffing  Performed by: Terri Richard MD  Authorized by: Terri Richard MD    Preanesthetic Checklist  Completed: patient identified, IV checked, site marked, risks and benefits discussed, surgical consent, monitors and equipment checked, pre-op evaluation and timeout performed  CSE  Patient position: sitting  Prep: Betadine  Patient monitoring: heart rate, continuous pulse ox and frequent blood pressure checks  Approach: midline  Spinal Needle  Needle type: pencan.   Needle gauge: 27 G  Needle length: 9 cm  Epidural Needle  Injection technique: MARICEL saline  Needle type: Tuohy   Needle gauge: 18 G  Needle length: 9 cm  Needle insertion depth: 6 cm  Location: L3-L4  Catheter  Catheter type: side hole  Catheter size: 20 G  Catheter securement method: stabilization device  Test dose: negative  Assessment  Sensory level: T10  Injection Assessment:  negative aspiration for heme, no pain on injection, no paresthesia on injection and positive aspiration for clear CSF.  Additional Notes  Epidural attempt x3, MARICEL easily achieved at L2/3 and L3/4, but unable to thread catheter despite additional saline, change to 19/17 epidural catheter system and troubleshooting. CSE done to confirm position with clear CSF return. 20 G catheter threaded on third attempt.

## 2024-11-28 NOTE — H&P
H & P- Obstetrics   Soraya Gómez 19 y.o. female MRN: [id number]  Unit/Bed#: -01 Encounter: [id number]    Assessment: 19 y.o.  at 39w2d admitted for spontaneous rupture of membranes.      Plan:   Amniotic fluid leaking  Assessment & Plan  Reports rupture of membranes around 4 AM for copious amounts of clear fluid  Grossly ruptured on exam, nitrazine positive    Nonimmune to hepatitis B virus  Assessment & Plan  Recommend hepatitis B booster series through PCP postpartum    39 weeks gestation of pregnancy  Assessment & Plan  Admit for SROM   SVE 3-4/50/-1, remi Q3-5 on arrival, plan for expectant management   FEN: CLD   Admission labs: CBC/Type and Screen/Syphilis Screen   Analgesia at maternal request  Contraception: Desires post placental Mirena IUD, ordered  Rh positive: Postpartum RhoGAM not indicated  GBS negative: Prophylaxis not indicated          Discussed case and plan w/ Dr. Mo       Chief Complaint: leaking fluid    HPI: Soraya Gómez is a 19 y.o.  with an JULIA of 12/3/2024, by Ultrasound at 39w2d who is being admitted for spontaneous rupture of membranes. She complains of uterine contractions, occurring every 5 minutes, has large amounts of fluid leaking, and reports no VB. She states she has felt good FM.    Patient Active Problem List   Diagnosis    Supervision of normal pregnancy in third trimester    39 weeks gestation of pregnancy    Nonimmune to hepatitis B virus    Amniotic fluid leaking         Review of Systems   Gastrointestinal:  Positive for abdominal pain.   Genitourinary:  Positive for vaginal discharge.   Psychiatric/Behavioral:  The patient is nervous/anxious.    All other systems reviewed and are negative.      OB Hx:  OB History    Para Term  AB Living   1        SAB IAB Ectopic Multiple Live Births             # Outcome Date GA Lbr Kalpesh/2nd Weight Sex Type Anes PTL Lv   1 Current                Past Medical Hx:  No past medical history on  file.    Past Surgical hx:  No past surgical history on file.      No Known Allergies      Medications Prior to Admission:     Magnesium Oxide 250 MG TABS    Prenatal 27-1 MG TABS    acetaminophen (TYLENOL) 500 mg tablet    Objective:  Temp:  [98.5 °F (36.9 °C)] 98.5 °F (36.9 °C)  HR:  [66] 66  BP: (106)/(63) 106/63  Resp:  [18] 18  Body mass index is 23.79 kg/m².     Physical Exam:  Physical Exam  Constitutional:       General: She is not in acute distress.     Appearance: Normal appearance.   Genitourinary:      Genitourinary Comments: Grossly ruptured, Nitrazine +    HENT:      Head: Normocephalic and atraumatic.   Cardiovascular:      Rate and Rhythm: Normal rate.   Pulmonary:      Effort: Pulmonary effort is normal.   Abdominal:      Palpations: Abdomen is soft.      Tenderness: There is no abdominal tenderness.      Comments: Gravid   Neurological:      General: No focal deficit present.      Mental Status: She is alert.   Skin:     General: Skin is warm and dry.   Psychiatric:         Mood and Affect: Mood normal.            FHT:  Baseline 130 bpm  Variability: Moderate 6-25 bpm   Accelerations: Present 15x15 at various times   Decelerations: None     TOCO:   Contractions Q3-5     Lab Results   Component Value Date    WBC 12.32 (H) 11/28/2024    HGB 12.0 11/28/2024    HCT 35.1 11/28/2024     11/28/2024     Lab Results   Component Value Date    K 3.7 06/23/2020     06/23/2020    CO2 24 06/23/2020    BUN 10 06/23/2020    CREATININE 0.89 (H) 06/23/2020    AST 12 06/23/2020    ALT 13 06/23/2020     Prenatal Labs: Reviewed      Blood type: O positive  Antibody: negative  GBS: negative  HIV: non-reactive  Rubella: immune  Syphilis IgM/IgG: non-reactive  HBsAg: non-reactive  HCAb: non-reactive  Chlamydia: negative  Gonorrhea: negative  Diabetes 1 hour screen: 96  3 hour glucose: not indicated  Platelets: 234k on 11/28/24  Hgb: 12.0 on 11/28/24  >2 Midnights  INPATIENT     Signature/Title: Rosalina Salas  MD Deisy  Date: 11/28/2024  Time: 7:57 AM

## 2024-11-28 NOTE — OB LABOR/OXYTOCIN SAFETY PROGRESS
Oxytocin Safety Progress Check Note - Soraya Gómez 19 y.o. female MRN: [id number]    Unit/Bed#: -01 Encounter: [id number]       Contraction Frequency (minutes): 2-3  Contraction Intensity: Moderate  Uterine Activity Characteristics: Irregular  Cervical Dilation: 10  Dilation Complete Date: 11/28/24  Dilation Complete Time: 0925  Cervical Effacement: 100  Fetal Station: 2  Baseline Rate (FHR): 125 bpm  Fetal Heart Rate (FHT): 115 BPM  FHR Category: 2               Vital Signs:   Vitals:    11/28/24 0941   BP: 120/75   Pulse: 89   Resp:    Temp: 98.4 °F (36.9 °C)       Notes/comments:   Now pushing for approximately 15 minutes.  Excellent maternal effort noted with tug-of-war.  Pitocin started to aid in contraction frequency and supplement maternal efforts.  Fetal heart tracing category 2 with intermittent variable decelerations however overall category 1 with a baseline of 120 bpm, moderate variability and accelerations.  Will plan to continue pushing.  Dr. Delphine Olivas MD 11/28/2024 10:28 AM

## 2024-11-28 NOTE — ANESTHESIA POSTPROCEDURE EVALUATION
Post-Op Assessment Note    CV Status:  Stable    Pain management: adequate      Post-op block assessment: catheter intact and no complications   Mental Status:  Awake   Hydration Status:  Stable   PONV Controlled:  Controlled   Airway Patency:  Patent     Post Op Vitals Reviewed: Yes    No anethesia notable event occurred.    Staff: Anesthesiologist         /57   Pulse 66   Temp 98.4 °F (36.9 °C)   Resp 18   Ht 5' (1.524 m)   LMP 04/01/2024 (Approximate)   BMI 23.79 kg/m²     Last Filed PACU Vitals:  Vitals Value Taken Time   Temp     Pulse     BP     Resp     SpO2         Modified Anastacia:  No data recorded

## 2024-11-29 VITALS
SYSTOLIC BLOOD PRESSURE: 109 MMHG | HEIGHT: 60 IN | DIASTOLIC BLOOD PRESSURE: 75 MMHG | OXYGEN SATURATION: 98 % | BODY MASS INDEX: 23.75 KG/M2 | WEIGHT: 121 LBS | RESPIRATION RATE: 16 BRPM | HEART RATE: 80 BPM | TEMPERATURE: 97.8 F

## 2024-11-29 PROCEDURE — NC001 PR NO CHARGE: Performed by: OBSTETRICS & GYNECOLOGY

## 2024-11-29 PROCEDURE — 99024 POSTOP FOLLOW-UP VISIT: CPT | Performed by: OBSTETRICS & GYNECOLOGY

## 2024-11-29 RX ORDER — ACETAMINOPHEN 325 MG/1
650 TABLET ORAL EVERY 4 HOURS PRN
Start: 2024-11-29

## 2024-11-29 RX ORDER — IBUPROFEN 600 MG/1
600 TABLET, FILM COATED ORAL EVERY 6 HOURS
Qty: 30 TABLET | Refills: 0 | Status: SHIPPED | OUTPATIENT
Start: 2024-11-29

## 2024-11-29 RX ADMIN — IBUPROFEN 600 MG: 600 TABLET, FILM COATED ORAL at 06:35

## 2024-11-29 RX ADMIN — DOCUSATE SODIUM 100 MG: 100 CAPSULE, LIQUID FILLED ORAL at 10:09

## 2024-11-29 NOTE — PROGRESS NOTES
Discharge instructions reviewed with pt. Pt is aware that she is to make a 3 week postpartum follow up appt with OB/GYN.

## 2024-11-29 NOTE — PLAN OF CARE
Problem: PAIN - ADULT  Goal: Verbalizes/displays adequate comfort level or baseline comfort level  Description: Interventions:  - Encourage patient to monitor pain and request assistance  - Assess pain using appropriate pain scale  - Administer analgesics based on type and severity of pain and evaluate response  - Implement non-pharmacological measures as appropriate and evaluate response  - Consider cultural and social influences on pain and pain management  - Notify physician/advanced practitioner if interventions unsuccessful or patient reports new pain  Outcome: Progressing     Problem: INFECTION - ADULT  Goal: Absence or prevention of progression during hospitalization  Description: INTERVENTIONS:  - Assess and monitor for signs and symptoms of infection  - Monitor lab/diagnostic results  - Monitor all insertion sites, i.e. indwelling lines, tubes, and drains  - Monitor endotracheal if appropriate and nasal secretions for changes in amount and color  - [place] appropriate cooling/warming therapies per order  - Administer medications as ordered  - Instruct and encourage patient and family to use good hand hygiene technique  - Identify and instruct in appropriate isolation precautions for identified infection/condition  Outcome: Progressing  Goal: Absence of fever/infection during neutropenic period  Description: INTERVENTIONS:  - Monitor WBC    Outcome: Progressing     Problem: SAFETY ADULT  Goal: Patient will remain free of falls  Description: INTERVENTIONS:  - Educate patient/family on patient safety including physical limitations  - Instruct patient to call for assistance with activity   - Consult OT/PT to assist with strengthening/mobility   - Keep Call bell within reach  - Keep bed low and locked with side rails adjusted as appropriate  - Keep care items and personal belongings within reach  - Initiate and maintain comfort rounds  - Apply yellow socks and bracelet for high fall risk patients  - Consider  moving patient to room near nurses station  Outcome: Progressing  Goal: Maintain or return to baseline ADL function  Description: INTERVENTIONS:  -  Assess patient's ability to carry out ADLs; assess patient's baseline for ADL function and identify physical deficits which impact ability to perform ADLs (bathing, care of mouth/teeth, toileting, grooming, dressing, etc.)  - Assess/evaluate cause of self-care deficits   - Assess range of motion  - Assess patient's mobility; develop plan if impaired  - Assess patient's need for assistive devices and provide as appropriate  - Encourage maximum independence but intervene and supervise when necessary  - Involve family in performance of ADLs  - Assess for home care needs following discharge   - Consider OT consult to assist with ADL evaluation and planning for discharge  - Provide patient education as appropriate  Outcome: Progressing  Goal: Maintains/Returns to pre admission functional level  Description: INTERVENTIONS:  - Perform AM-PAC 6 Click Basic Mobility/ Daily Activity assessment daily.  - Set and communicate daily mobility goal to care team and patient/family/caregiver.   - Collaborate with rehabilitation services on mobility goals if consulted  - Out of bed for toileting  - Record patient progress and toleration of activity level   Outcome: Progressing     Problem: DISCHARGE PLANNING  Goal: Discharge to home or other facility with appropriate resources  Description: INTERVENTIONS:  - Identify barriers to discharge w/patient and caregiver  - Arrange for needed discharge resources and transportation as appropriate  - Identify discharge learning needs (meds, wound care, etc.)  - Arrange for interpretive services to assist at discharge as needed  - Refer to Case Management Department for coordinating discharge planning if the patient needs post-hospital services based on physician/advanced practitioner order or complex needs related to functional status, cognitive  ability, or social support system  Outcome: Progressing     Problem: Knowledge Deficit  Goal: Patient/family/caregiver demonstrates understanding of disease process, treatment plan, medications, and discharge instructions  Description: Complete learning assessment and assess knowledge base.  Interventions:  - Provide teaching at level of understanding  - Provide teaching via preferred learning methods  Outcome: Progressing     Problem: Nutrition/Hydration-ADULT  Goal: Nutrient/Hydration intake appropriate for improving, restoring or maintaining nutritional needs  Description: Monitor and assess patient's nutrition/hydration status for malnutrition. Collaborate with interdisciplinary team and initiate plan and interventions as ordered.  Monitor patient's weight and dietary intake as ordered or per policy. Utilize nutrition screening tool and intervene as necessary. Determine patient's food preferences and provide high-protein, high-caloric foods as appropriate.     INTERVENTIONS:  - Monitor oral intake, urinary output, labs, and treatment plans  - Assess nutrition and hydration status and recommend course of action  - Evaluate amount of meals eaten  - Assist patient with eating if necessary   - Allow adequate time for meals  - Recommend/ encourage appropriate diets, oral nutritional supplements, and vitamin/mineral supplements  - Order, calculate, and assess calorie counts as needed  - Recommend, monitor, and adjust tube feedings and TPN/PPN based on assessed needs  - Assess need for intravenous fluids  - Provide specific nutrition/hydration education as appropriate  - Include patient/family/caregiver in decisions related to nutrition  Outcome: Progressing     Problem: POSTPARTUM  Goal: Experiences normal postpartum course  Description: INTERVENTIONS:  - Monitor maternal vital signs  - Assess uterine involution and lochia  Outcome: Progressing  Goal: Appropriate maternal -  bonding  Description:  INTERVENTIONS:  - Identify family support  - Assess for appropriate maternal/infant bonding   -Encourage maternal/infant bonding opportunities  - Referral to  or  as needed  Outcome: Progressing  Goal: Establishment of infant feeding pattern  Description: INTERVENTIONS:  - Assess breast/bottle feeding  - Refer to lactation as needed  Outcome: Progressing  Goal: Incision(s), wounds(s) or drain site(s) healing without S/S of infection  Description: INTERVENTIONS  - Assess and document dressing, incision, wound bed, drain sites and surrounding tissue  - Provide patient and family education  Outcome: Progressing

## 2024-11-29 NOTE — PROGRESS NOTES
Obstetrics Progress Note  Soraya Gómez 19 y.o. female MRN: [id number]  Unit/Bed#: -01 Encounter: [id number]    Assessment/Plan:  Postpartum Day #1 s/p  and postplacental IUD placement. Stable. Baby in room. By issue:  Assessment & Plan   (spontaneous vaginal delivery)  Routine postpartum care  Encourage ambulation  Encourage familial bonding  Lactation support as needed  Pain: Motrin/Tylenol around the clock  Postpartum Contraceptive plan: PP IUD  Voiding spontaneously  DVT Ppx: ambulation  Nonimmune to hepatitis B virus  Recommend hepatitis B booster series through PCP postpartum      Anticipate discharge PPD #1 vs #2.    Subjective/Objective   Chief Complaint:   Post delivery     Subjective:   Pain: well controlled. Tolerating PO: yes. Voiding: yes. Flatus: yes. Ambulating: yes. Chest pain: no. Shortness of breath: no. Leg pain: no. Lochia: within normal limits. Infant feeding: bottle.    Objective:   Vitals:   Temp:  [97.6 °F (36.4 °C)-98.5 °F (36.9 °C)] 97.6 °F (36.4 °C)  HR:  [] 76  BP: ()/(50-80) 109/80  Resp:  [16-18] 16  SpO2:  [97 %] 97 %  O2 Device: None (Room air)       Intake/Output Summary (Last 24 hours) at 2024 0621  Last data filed at 2024 2315  Gross per 24 hour   Intake --   Output 1925 ml   Net -1925 ml       Lab Results   Component Value Date    WBC 12.32 (H) 2024    HGB 12.0 2024    HCT 35.1 2024    MCV 87 2024     2024       Physical Exam:   General: alert and oriented x3, in no apparent distress  Cardiovascular: regular rate  Pulmonary: normal effort  Abdomen: Soft, non-tender, non-distended, no rebound or guarding. Uterine fundus firm and non-tender, at the umbilicus   Extremities: Non tender    Alice Crawford MD  PGY-2, OBGYN  2024, 6:21 AM

## 2024-11-29 NOTE — ASSESSMENT & PLAN NOTE
Admit for [facility]   SVE 3-4/50/-1, remi Q3-5 on arrival, plan for expectant management   FEN: CLD   Admission labs: CBC/Type and Screen/Syphilis Screen   Analgesia at maternal request  Contraception: Desires post placental Mirena IUD, ordered  Rh positive: Postpartum RhoGAM not indicated  GBS negative: Prophylaxis not indicated  
Recommend hepatitis B booster series through PCP postpartum  
Reports rupture of membranes around 4 AM for copious amounts of clear fluid  Grossly ruptured on exam, nitrazine positive  
Routine postpartum care  Encourage ambulation  Encourage familial bonding  Lactation support as needed  Pain: Motrin/Tylenol around the clock  Postpartum Contraceptive plan: PP IUD  Voiding spontaneously  DVT Ppx: ambulation  
complains of pain/discomfort

## 2024-11-29 NOTE — PLAN OF CARE
Problem: PAIN - ADULT  Goal: Verbalizes/displays adequate comfort level or baseline comfort level  Description: Interventions:  - Encourage patient to monitor pain and request assistance  - Assess pain using appropriate pain scale  - Administer analgesics based on type and severity of pain and evaluate response  - Implement non-pharmacological measures as appropriate and evaluate response  - Consider cultural and social influences on pain and pain management  - Notify physician/advanced practitioner if interventions unsuccessful or patient reports new pain  Outcome: Progressing     Problem: INFECTION - ADULT  Goal: Absence or prevention of progression during hospitalization  Description: INTERVENTIONS:  - Assess and monitor for signs and symptoms of infection  - Monitor lab/diagnostic results  - Monitor all insertion sites, i.e. indwelling lines, tubes, and drains  - Monitor endotracheal if appropriate and nasal secretions for changes in amount and color  - [place] appropriate cooling/warming therapies per order  - Administer medications as ordered  - Instruct and encourage patient and family to use good hand hygiene technique  - Identify and instruct in appropriate isolation precautions for identified infection/condition  Outcome: Progressing  Goal: Absence of fever/infection during neutropenic period  Description: INTERVENTIONS:  - Monitor WBC    Outcome: Progressing     Problem: SAFETY ADULT  Goal: Patient will remain free of falls  Description: INTERVENTIONS:  - Educate patient/family on patient safety including physical limitations  - Instruct patient to call for assistance with activity   - Consult OT/PT to assist with strengthening/mobility   - Keep Call bell within reach  - Keep bed low and locked with side rails adjusted as appropriate  - Keep care items and personal belongings within reach  - Initiate and maintain comfort rounds  - Make Fall Risk Sign visible to staff  - Offer Toileting every  Hours,  in advance of need  - Initiate/Maintain alarm  - Obtain necessary fall risk management equipment:   - Apply yellow socks and bracelet for high fall risk patients  - Consider moving patient to room near nurses station  Outcome: Progressing  Goal: Maintain or return to baseline ADL function  Description: INTERVENTIONS:  -  Assess patient's ability to carry out ADLs; assess patient's baseline for ADL function and identify physical deficits which impact ability to perform ADLs (bathing, care of mouth/teeth, toileting, grooming, dressing, etc.)  - Assess/evaluate cause of self-care deficits   - Assess range of motion  - Assess patient's mobility; develop plan if impaired  - Assess patient's need for assistive devices and provide as appropriate  - Encourage maximum independence but intervene and supervise when necessary  - Involve family in performance of ADLs  - Assess for home care needs following discharge   - Consider OT consult to assist with ADL evaluation and planning for discharge  - Provide patient education as appropriate  Outcome: Progressing  Goal: Maintains/Returns to pre admission functional level  Description: INTERVENTIONS:  - Perform AM-PAC 6 Click Basic Mobility/ Daily Activity assessment daily.  - Set and communicate daily mobility goal to care team and patient/family/caregiver.   - Collaborate with rehabilitation services on mobility goals if consulted  - Record patient progress and toleration of activity level   Outcome: Progressing     Problem: DISCHARGE PLANNING  Goal: Discharge to home or other facility with appropriate resources  Description: INTERVENTIONS:  - Identify barriers to discharge w/patient and caregiver  - Arrange for needed discharge resources and transportation as appropriate  - Identify discharge learning needs (meds, wound care, etc.)  - Arrange for interpretive services to assist at discharge as needed  - Refer to Case Management Department for coordinating discharge planning if  the patient needs post-hospital services based on physician/advanced practitioner order or complex needs related to functional status, cognitive ability, or social support system  Outcome: Progressing     Problem: Knowledge Deficit  Goal: Patient/family/caregiver demonstrates understanding of disease process, treatment plan, medications, and discharge instructions  Description: Complete learning assessment and assess knowledge base.  Interventions:  - Provide teaching at level of understanding  - Provide teaching via preferred learning methods  Outcome: Progressing     Problem: Nutrition/Hydration-ADULT  Goal: Nutrient/Hydration intake appropriate for improving, restoring or maintaining nutritional needs  Description: Monitor and assess patient's nutrition/hydration status for malnutrition. Collaborate with interdisciplinary team and initiate plan and interventions as ordered.  Monitor patient's weight and dietary intake as ordered or per policy. Utilize nutrition screening tool and intervene as necessary. Determine patient's food preferences and provide high-protein, high-caloric foods as appropriate.     INTERVENTIONS:  - Monitor oral intake, urinary output, labs, and treatment plans  - Assess nutrition and hydration status and recommend course of action  - Evaluate amount of meals eaten  - Assist patient with eating if necessary   - Allow adequate time for meals  - Recommend/ encourage appropriate diets, oral nutritional supplements, and vitamin/mineral supplements  - Order, calculate, and assess calorie counts as needed  - Recommend, monitor, and adjust tube feedings and TPN/PPN based on assessed needs  - Assess need for intravenous fluids  - Provide specific nutrition/hydration education as appropriate  - Include patient/family/caregiver in decisions related to nutrition  Outcome: Progressing

## 2024-11-29 NOTE — PROGRESS NOTES
Postpartum discharge instructions & teaching per unit protocol reviewed with patient. Patient verbalizes understanding & denies questions at this time.

## 2024-11-29 NOTE — PROGRESS NOTES
Information for [facility] provided to patient & explained. Patient verbalizes understanding.

## 2024-12-02 ENCOUNTER — TELEPHONE (OUTPATIENT)
Dept: OBGYN CLINIC | Facility: MEDICAL CENTER | Age: 19
End: 2024-12-02

## 2024-12-02 NOTE — TELEPHONE ENCOUNTER
OB nurse navigator attempted to get in contact with patient for post partum assessment call but unable to complete call at this time. Patient unavailable. Unable to leave message.

## 2024-12-02 NOTE — UTILIZATION REVIEW
Mother and baby discharged on 2024    NOTIFICATION OF INPATIENT ADMISSION   MATERNITY/DELIVERY AUTHORIZATION REQUEST   SERVICING FACILITY:   [facility] - L&D, , NICU  [address] [place] [address]  Tax ID: [id number]  NPI: [id number] ATTENDING PROVIDER:  Attending Name and NPI#: Donna Akers Md [[id number]]  Address: [address] [place] [address]  Phone: [phone]     ADMISSION INFORMATION:  Place of Service: Inpatient [facility]  Place of Service Code: 21  Inpatient Admission Date/Time: 24  6:56 AM  Discharge Date/Time: 2024  3:07 PM  Admitting Diagnosis Code/Description:  Prolonged rupture of membranes [O42.90]   Mother: Soraya Gómez 2005 Estimated Date of Delivery: 12/3/24  Delivering clinician: Donna Akers   OB History          1    Para   1    Term   1            AB        Living   1         SAB        IAB        Ectopic        Multiple   0    Live Births   1                Name & MRN:   Information for the patient's :  Gianni Ledezma [[id number]]   [place] Delivery Information:  Sex: male  Delivered 2024 11:43 AM by Vaginal, Spontaneous; Gestational Age: 39w2d     Measurements:  Weight: 6 lb 12 oz (3062 g);  Height:      APGAR 1 minute 5 minutes 10 minutes   Totals: 7 9       UTILIZATION REVIEW CONTACT:  Kimberley Siegel, Utilization   Network Utilization Review Department  Phone: [phone]  Fax [phone]  Email: [email or web address]  Contact for approvals/pending authorizations, clinical reviews, and discharge.   PHYSICIAN ADVISORY SERVICES:  Medical Necessity Denial & Xbtc-io-Vujf Review  Phone: [phone]  Fax: [phone]  Email: [email or web address]   DISCHARGE SUPPORT TEAM:  For Patients Discharge Needs & Updates  Phone: [phone] opt. 2 Fax: [phone]  Email: [email or web address]

## 2024-12-06 LAB — PLACENTA IN STORAGE: NORMAL

## 2024-12-08 ENCOUNTER — HOSPITAL ENCOUNTER (EMERGENCY)
Facility: HOSPITAL | Age: 19
Discharge: HOME | End: 2024-12-08
Attending: FAMILY MEDICINE | Admitting: FAMILY MEDICINE
Payer: COMMERCIAL

## 2024-12-08 ENCOUNTER — APPOINTMENT (EMERGENCY)
Dept: CT IMAGING | Facility: HOSPITAL | Age: 19
End: 2024-12-08
Payer: COMMERCIAL

## 2024-12-08 VITALS
DIASTOLIC BLOOD PRESSURE: 71 MMHG | HEART RATE: 80 BPM | TEMPERATURE: 98.5 F | RESPIRATION RATE: 18 BRPM | OXYGEN SATURATION: 98 % | HEIGHT: 65 IN | WEIGHT: 106 LBS | BODY MASS INDEX: 17.66 KG/M2 | SYSTOLIC BLOOD PRESSURE: 110 MMHG

## 2024-12-08 DIAGNOSIS — D72.829 LEUKOCYTOSIS: ICD-10-CM

## 2024-12-08 DIAGNOSIS — R19.7 NAUSEA VOMITING AND DIARRHEA: Primary | ICD-10-CM

## 2024-12-08 DIAGNOSIS — R11.2 NAUSEA VOMITING AND DIARRHEA: Primary | ICD-10-CM

## 2024-12-08 DIAGNOSIS — N39.0 UTI (URINARY TRACT INFECTION): ICD-10-CM

## 2024-12-08 DIAGNOSIS — R51.9 HEADACHE: ICD-10-CM

## 2024-12-08 DIAGNOSIS — E83.42 HYPOMAGNESEMIA: ICD-10-CM

## 2024-12-08 LAB
ALBUMIN SERPL BCG-MCNC: 3.7 G/DL (ref 3.5–5)
ALP SERPL-CCNC: 70 U/L (ref 34–104)
ALT SERPL W P-5'-P-CCNC: 11 U/L (ref 7–52)
ANION GAP SERPL CALCULATED.3IONS-SCNC: 11 MMOL/L (ref 4–13)
AST SERPL W P-5'-P-CCNC: 12 U/L (ref 13–39)
BACTERIA UR QL AUTO: ABNORMAL /HPF
BASOPHILS # BLD MANUAL: 0 THOUSAND/UL (ref 0–0.1)
BASOPHILS NFR MAR MANUAL: 0 % (ref 0–1)
BILIRUB SERPL-MCNC: 0.73 MG/DL (ref 0.2–1)
BILIRUB UR QL STRIP: NEGATIVE
BUN SERPL-MCNC: 12 MG/DL (ref 5–25)
CALCIUM SERPL-MCNC: 8.7 MG/DL (ref 8.4–10.2)
CHLORIDE SERPL-SCNC: 106 MMOL/L (ref 96–108)
CLARITY UR: ABNORMAL
CO2 SERPL-SCNC: 21 MMOL/L (ref 21–32)
COLOR UR: YELLOW
CREAT SERPL-MCNC: 0.66 MG/DL (ref 0.6–1.3)
EOSINOPHIL # BLD MANUAL: 0 THOUSAND/UL (ref 0–0.4)
EOSINOPHIL NFR BLD MANUAL: 0 % (ref 0–6)
ERYTHROCYTE [DISTWIDTH] IN BLOOD BY AUTOMATED COUNT: 12.7 % (ref 11.6–15.1)
FLUAV RNA RESP QL NAA+PROBE: NEGATIVE
FLUBV RNA RESP QL NAA+PROBE: NEGATIVE
GFR SERPL CREATININE-BSD FRML MDRD: 127 ML/MIN/1.73SQ M
GLUCOSE SERPL-MCNC: 129 MG/DL (ref 65–140)
GLUCOSE UR STRIP-MCNC: NEGATIVE MG/DL
HCT VFR BLD AUTO: 39.8 % (ref 34.8–46.1)
HGB BLD-MCNC: 13.5 G/DL (ref 11.5–15.4)
HGB UR QL STRIP.AUTO: ABNORMAL
KETONES UR STRIP-MCNC: NEGATIVE MG/DL
LEUKOCYTE ESTERASE UR QL STRIP: ABNORMAL
LIPASE SERPL-CCNC: 9 U/L (ref 11–82)
LYMPHOCYTES # BLD AUTO: 0.38 THOUSAND/UL (ref 0.6–4.47)
LYMPHOCYTES # BLD AUTO: 2 % (ref 14–44)
MAGNESIUM SERPL-MCNC: 1.5 MG/DL (ref 1.9–2.7)
MCH RBC QN AUTO: 29.3 PG (ref 26.8–34.3)
MCHC RBC AUTO-ENTMCNC: 33.9 G/DL (ref 31.4–37.4)
MCV RBC AUTO: 86 FL (ref 82–98)
MONOCYTES # BLD AUTO: 0.57 THOUSAND/UL (ref 0–1.22)
MONOCYTES NFR BLD: 3 % (ref 4–12)
NEUTROPHILS # BLD MANUAL: 18.15 THOUSAND/UL (ref 1.85–7.62)
NEUTS SEG NFR BLD AUTO: 95 % (ref 43–75)
NITRITE UR QL STRIP: NEGATIVE
NON-SQ EPI CELLS URNS QL MICRO: ABNORMAL /HPF
PH UR STRIP.AUTO: 8.5 [PH]
PLATELET # BLD AUTO: 394 THOUSANDS/UL (ref 149–390)
PLATELET BLD QL SMEAR: ABNORMAL
PMV BLD AUTO: 10.3 FL (ref 8.9–12.7)
POTASSIUM SERPL-SCNC: 3.5 MMOL/L (ref 3.5–5.3)
PROT SERPL-MCNC: 7.2 G/DL (ref 6.4–8.4)
PROT UR STRIP-MCNC: ABNORMAL MG/DL
RBC # BLD AUTO: 4.61 MILLION/UL (ref 3.81–5.12)
RBC #/AREA URNS AUTO: ABNORMAL /HPF
RBC MORPH BLD: NORMAL
RSV RNA RESP QL NAA+PROBE: NEGATIVE
SARS-COV-2 RNA RESP QL NAA+PROBE: NEGATIVE
SODIUM SERPL-SCNC: 138 MMOL/L (ref 135–147)
SP GR UR STRIP.AUTO: 1.01
UROBILINOGEN UR QL STRIP.AUTO: 0.2 E.U./DL
WBC # BLD AUTO: 19.1 THOUSAND/UL (ref 4.31–10.16)
WBC #/AREA URNS AUTO: ABNORMAL /HPF

## 2024-12-08 PROCEDURE — 96375 TX/PRO/DX INJ NEW DRUG ADDON: CPT

## 2024-12-08 PROCEDURE — 0241U HB NFCT DS VIR RESP RNA 4 TRGT: CPT | Performed by: PHYSICIAN ASSISTANT

## 2024-12-08 PROCEDURE — 96365 THER/PROPH/DIAG IV INF INIT: CPT

## 2024-12-08 PROCEDURE — 85027 COMPLETE CBC AUTOMATED: CPT | Performed by: PHYSICIAN ASSISTANT

## 2024-12-08 PROCEDURE — 87086 URINE CULTURE/COLONY COUNT: CPT | Performed by: PHYSICIAN ASSISTANT

## 2024-12-08 PROCEDURE — 96361 HYDRATE IV INFUSION ADD-ON: CPT

## 2024-12-08 PROCEDURE — 99285 EMERGENCY DEPT VISIT HI MDM: CPT | Performed by: PHYSICIAN ASSISTANT

## 2024-12-08 PROCEDURE — 99284 EMERGENCY DEPT VISIT MOD MDM: CPT

## 2024-12-08 PROCEDURE — 85007 BL SMEAR W/DIFF WBC COUNT: CPT | Performed by: PHYSICIAN ASSISTANT

## 2024-12-08 PROCEDURE — 36415 COLL VENOUS BLD VENIPUNCTURE: CPT | Performed by: PHYSICIAN ASSISTANT

## 2024-12-08 PROCEDURE — 80053 COMPREHEN METABOLIC PANEL: CPT | Performed by: PHYSICIAN ASSISTANT

## 2024-12-08 PROCEDURE — 81001 URINALYSIS AUTO W/SCOPE: CPT | Performed by: PHYSICIAN ASSISTANT

## 2024-12-08 PROCEDURE — 83690 ASSAY OF LIPASE: CPT | Performed by: PHYSICIAN ASSISTANT

## 2024-12-08 PROCEDURE — 83735 ASSAY OF MAGNESIUM: CPT | Performed by: PHYSICIAN ASSISTANT

## 2024-12-08 PROCEDURE — 74177 CT ABD & PELVIS W/CONTRAST: CPT

## 2024-12-08 PROCEDURE — 96367 TX/PROPH/DG ADDL SEQ IV INF: CPT

## 2024-12-08 RX ORDER — KETOROLAC TROMETHAMINE 30 MG/ML
15 INJECTION, SOLUTION INTRAMUSCULAR; INTRAVENOUS ONCE
Status: COMPLETED | OUTPATIENT
Start: 2024-12-08 | End: 2024-12-08

## 2024-12-08 RX ORDER — MAGNESIUM SULFATE HEPTAHYDRATE 40 MG/ML
2 INJECTION, SOLUTION INTRAVENOUS ONCE
Status: COMPLETED | OUTPATIENT
Start: 2024-12-08 | End: 2024-12-08

## 2024-12-08 RX ORDER — ONDANSETRON 2 MG/ML
4 INJECTION INTRAMUSCULAR; INTRAVENOUS ONCE
Status: COMPLETED | OUTPATIENT
Start: 2024-12-08 | End: 2024-12-08

## 2024-12-08 RX ORDER — CEFTRIAXONE 1 G/50ML
1000 INJECTION, SOLUTION INTRAVENOUS ONCE
Status: COMPLETED | OUTPATIENT
Start: 2024-12-08 | End: 2024-12-08

## 2024-12-08 RX ORDER — CEPHALEXIN 500 MG/1
500 CAPSULE ORAL EVERY 12 HOURS SCHEDULED
Qty: 14 CAPSULE | Refills: 0 | Status: SHIPPED | OUTPATIENT
Start: 2024-12-08 | End: 2024-12-15

## 2024-12-08 RX ORDER — ONDANSETRON 4 MG/1
4 TABLET, ORALLY DISINTEGRATING ORAL EVERY 6 HOURS PRN
Qty: 20 TABLET | Refills: 0 | Status: SHIPPED | OUTPATIENT
Start: 2024-12-08

## 2024-12-08 RX ADMIN — CEFTRIAXONE 1000 MG: 1 INJECTION, SOLUTION INTRAVENOUS at 11:24

## 2024-12-08 RX ADMIN — ONDANSETRON 4 MG: 2 INJECTION INTRAMUSCULAR; INTRAVENOUS at 09:30

## 2024-12-08 RX ADMIN — SODIUM CHLORIDE 1000 ML: 0.9 INJECTION, SOLUTION INTRAVENOUS at 11:24

## 2024-12-08 RX ADMIN — MAGNESIUM SULFATE HEPTAHYDRATE 2 G: 40 INJECTION, SOLUTION INTRAVENOUS at 09:59

## 2024-12-08 RX ADMIN — KETOROLAC TROMETHAMINE 15 MG: 30 INJECTION, SOLUTION INTRAMUSCULAR at 09:30

## 2024-12-08 RX ADMIN — IOHEXOL 90 ML: 350 INJECTION, SOLUTION INTRAVENOUS at 10:10

## 2024-12-08 RX ADMIN — SODIUM CHLORIDE 1000 ML: 0.9 INJECTION, SOLUTION INTRAVENOUS at 09:28

## 2024-12-08 NOTE — ED PROVIDER NOTES
Time reflects when diagnosis was documented in both MDM as applicable and the Disposition within this note       Time User Action Codes Description Comment    12/8/2024 11:22 AM Beatrice Corral [R11.2,  R19.7] Nausea vomiting and diarrhea     12/8/2024 11:22 AM Beatrice Corral [N39.0] UTI (urinary tract infection)     12/8/2024 11:22 AM Beatrice Corral [R51.9] Headache     12/8/2024 11:23 AM Beatrice Corral [E83.42] Hypomagnesemia     12/8/2024 11:23 AM Beatrice Corral [D72.829] Leukocytosis           ED Disposition       ED Disposition   Discharge    Condition   Stable    Date/Time   Sun Dec 8, 2024 11:51 AM    Comment   Soraya Gómez discharge to home/self care.                   Assessment & Plan       Medical Decision Making  Patient is a 19-year-old female, s/p uncomplicated spontaneous vaginal delivery on 11/28/24, presenting to the ED for evaluation of nausea, vomiting, headache and fevers x1 day.     Patient's labs are notable for a leukocytosis (WBC count 19) and hypomagnesemia (magnesium 1.5) but otherwise unremarkable.  UA shows innumerable bacteria and WBCs, consistent with UTI.  Suspect leukocytosis secondary to infection/UTI as well as reactive in the setting of vomiting/dehydration.  CT abdomen/pelvis shows no acute intra-abdominal abnormality. Symptoms likely secondary to a viral illness/gastroenteritis as well as UTI.  Low suspicion for post-dural headache based on description of symptoms.  She had normal blood pressures in the ED, not consistent with preeclampsia.  There are no high-risk variables including neck pain/stiffness, witnessed LOC, onset during exertion, thunderclap headache quality (instantly peaking pain), nor is there limited neck flexion on examination. Patient had resolution of headache with fluids/Toradol, is tolerating PO and feels significantly improved at this time.  She was given a prescription for Zofran to take as needed for nausea vomiting.  She  was also given an initial dose of IV Rocephin in the ED and a prescription for Keflex to treat her UTI.  She has a scheduled appointment with her OB/GYN tomorrow for follow-up. She was advised to return to the ED  immediately if she experiences any new/worsening symptoms    The management plan was discussed in detail with the patient at bedside and all questions were answered. Strict ED return instructions were discussed at bedside. Prior to discharge, both verbal and written instructions were provided. We discussed the signs and symptoms that should prompt the patient to return to the ED. All questions were answered and the patient was comfortable with the plan of care and discharged home. The patient agrees to return to the Emergency Department for concerns and/or progression of illness.     Amount and/or Complexity of Data Reviewed  Labs: ordered. Decision-making details documented in ED Course.  Radiology: ordered.    Risk  Prescription drug management.        ED Course as of 12/08/24 1812   Sun Dec 08, 2024   0956 WBC(!): 19.10   0956 MAGNESIUM(!): 1.5   1033 Bacteria, UA(!): Innumerable   1033 WBC, UA(!): Innumerable       Medications   sodium chloride 0.9 % bolus 1,000 mL (0 mL Intravenous Stopped 12/8/24 1110)   ondansetron (ZOFRAN) injection 4 mg (4 mg Intravenous Given 12/8/24 0930)   ketorolac (TORADOL) injection 15 mg (15 mg Intravenous Given 12/8/24 0930)   magnesium sulfate 2 g/50 mL IVPB (premix) 2 g (0 g Intravenous Stopped 12/8/24 1059)   iohexol (OMNIPAQUE) 350 MG/ML injection (MULTI-DOSE) 100 mL (90 mL Intravenous Given 12/8/24 1010)   sodium chloride 0.9 % bolus 1,000 mL (0 mL Intravenous Stopped 12/8/24 1214)   cefTRIAXone (ROCEPHIN) IVPB (premix in dextrose) 1,000 mg 50 mL (0 mg Intravenous Stopped 12/8/24 1206)       ED Risk Strat Scores             CRAFFT      Flowsheet Row Most Recent Value   CRAFFT Initial Screen: During the past 12 months, did you:    1. Drink any alcohol (more than a few  "sips)?  No Filed at: 12/08/2024 0853   2. Smoke any marijuana or hashish No Filed at: 12/08/2024 0853   3. Use anything else to get high? (\"anything else\" includes illegal drugs, over the counter and prescription drugs, and things that you sniff or 'rollins')? No Filed at: 12/08/2024 0853                                          History of Present Illness       Chief Complaint   Patient presents with    Vomiting     Pt endorses vomiting, headache w/ fevers at home starting yesterday       Past Medical History:   Diagnosis Date    Scoliosis       History reviewed. No pertinent surgical history.   Family History   Problem Relation Age of Onset    Anemia Mother     No Known Problems Father     No Known Problems Brother     Other Maternal Grandmother         brain aneurysm    Hypertension Maternal Grandfather     Anemia Maternal Grandfather     No Known Problems Paternal Grandmother     No Known Problems Paternal Grandfather     Breast cancer Maternal Aunt     Colon cancer Neg Hx     Ovarian cancer Neg Hx       Social History     Tobacco Use    Smoking status: Never    Smokeless tobacco: Never   Vaping Use    Vaping status: Some Days    Substances: Nicotine, Flavoring   Substance Use Topics    Alcohol use: Never    Drug use: Never      E-Cigarette/Vaping    E-Cigarette Use Current Some Day User       E-Cigarette/Vaping Substances    Nicotine Yes     THC No     CBD No     Flavoring Yes     Other No     Unknown No       I have reviewed and agree with the history as documented.     Patient is a 19-year-old female, s/p uncomplicated spontaneous vaginal delivery on 11/28/24, presenting to the ED for evaluation of nausea, vomiting, headache and fevers x1 day. Patient states that she had a headache that came on gradually and progressively worsened throughout the day yesterday. She denies any sudden onset headaches or worst headache of her life. The headache is not positional and does not worsen with sitting/standing. She denies " any associated dizziness, neck pain, neck stiffness, back pain, vision changes, photophobia or tinnitus. She states that last night she developed nausea and generalized abdominal pain and had multiple episodes of nonbloody/nonbilious vomiting as well as multiple episodes of nonbloody/nonmelanotic diarrhea. She also reports having a fever and chills this morning. She denies any cough, chest pain, SOB, vaginal discharge or urinary symptoms. She denies any known sick contacts or suspicious food intake.         Review of Systems   Constitutional:  Positive for chills and fever.   HENT:  Negative for congestion, rhinorrhea and sore throat.    Eyes:  Negative for photophobia and visual disturbance.   Respiratory:  Negative for cough and shortness of breath.    Cardiovascular:  Negative for chest pain, palpitations and leg swelling.   Gastrointestinal:  Positive for abdominal pain, diarrhea, nausea and vomiting. Negative for constipation.   Genitourinary:  Negative for dysuria, flank pain, frequency and hematuria.   Musculoskeletal:  Positive for myalgias. Negative for back pain, neck pain and neck stiffness.   Skin:  Negative for rash.   Neurological:  Positive for headaches. Negative for dizziness, syncope and weakness.   All other systems reviewed and are negative.          Objective       ED Triage Vitals   Temperature Pulse Blood Pressure Respirations SpO2 Patient Position - Orthostatic VS   12/08/24 0854 12/08/24 0854 12/08/24 0854 12/08/24 0854 12/08/24 0854 12/08/24 0854   98.5 °F (36.9 °C) 92 121/73 18 98 % Sitting      Temp src Heart Rate Source BP Location FiO2 (%) Pain Score    -- 12/08/24 0854 12/08/24 0854 -- 12/08/24 0853     Monitor Right arm  6      Vitals      Date and Time Temp Pulse SpO2 Resp BP Pain Score FACES Pain Rating User   12/08/24 1200 -- 80 98 % 18 110/71 -- -- [facility]   12/08/24 1000 -- 77 97 % -- 104/69 -- --    12/08/24 0854 98.5 °F (36.9 °C) 92 98 % 18 121/73 -- -- TAB   12/08/24 0853 -- --  -- -- -- 6 -- TAB            Physical Exam  Vitals and nursing note reviewed.   Constitutional:       General: She is awake.      Appearance: Normal appearance. She is well-developed. She is not toxic-appearing or diaphoretic.   HENT:      Head: Normocephalic and atraumatic.      Right Ear: External ear normal.      Left Ear: External ear normal.      Nose: Nose normal.      Mouth/Throat:      Lips: Pink.      Mouth: Mucous membranes are moist.   Eyes:      General: Lids are normal. No scleral icterus.     Conjunctiva/sclera: Conjunctivae normal.      Pupils: Pupils are equal, round, and reactive to light.   Neck:      Comments: Patient is able to move neck freely in all directions, no nuchal rigidity.  Cardiovascular:      Rate and Rhythm: Normal rate and regular rhythm.      Pulses: Normal pulses.           Radial pulses are 2+ on the right side and 2+ on the left side.      Heart sounds: Normal heart sounds, S1 normal and S2 normal.      Comments: No lower extremity edema.  Pulmonary:      Effort: Pulmonary effort is normal. No accessory muscle usage.      Breath sounds: Normal breath sounds. No stridor. No decreased breath sounds, wheezing, rhonchi or rales.   Abdominal:      General: Abdomen is flat. Bowel sounds are normal. There is no distension.      Palpations: Abdomen is soft.      Tenderness: There is abdominal tenderness in the right upper quadrant, epigastric area and left upper quadrant. There is no right CVA tenderness, left CVA tenderness, guarding or rebound.      Comments: Tenderness to palpation primarily in the epigastric region and bilateral upper quadrants.  No rebound tenderness, guarding or rigidity.   Musculoskeletal:      Cervical back: Full passive range of motion without pain and neck supple. No signs of trauma. No pain with movement.      Right lower leg: No edema.      Left lower leg: No edema.   Lymphadenopathy:      Cervical: No cervical adenopathy.   Skin:     General: Skin is warm  and dry.      Capillary Refill: Capillary refill takes less than 2 seconds.      Coloration: Skin is not cyanotic, jaundiced or pale.   Neurological:      Mental Status: She is alert and oriented to person, place, and time.      GCS: GCS eye subscore is 4. GCS verbal subscore is 5. GCS motor subscore is 6.      Gait: Gait normal.      Comments: No focal neurological deficits.   Psychiatric:         Mood and Affect: Mood normal.         Speech: Speech normal.         Behavior: Behavior is cooperative.         Results Reviewed       Procedure Component Value Units Date/Time    RBC Morphology Reflex Test [[id number]] Collected: 12/08/24 0931    Lab Status: Final result Specimen: Blood from Arm, Right Updated: 12/08/24 1101    Urine Microscopic [[id number]]  (Abnormal) Collected: 12/08/24 0958    Lab Status: Final result Specimen: Urine, Clean Catch Updated: 12/08/24 1028     RBC, UA 2-4 /hpf      WBC, UA Innumerable /hpf      Epithelial Cells Occasional /hpf      Bacteria, UA Innumerable /hpf     Urine culture [[id number]] Collected: 12/08/24 0958    Lab Status: In process Specimen: Urine, Clean Catch Updated: 12/08/24 1028    UA w Reflex to Microscopic w Reflex to Culture [[id number]]  (Abnormal) Collected: 12/08/24 0958    Lab Status: Final result Specimen: Urine, Clean Catch Updated: 12/08/24 1018     Color, UA Yellow     Clarity, UA Cloudy     Specific Gravity, UA 1.015     pH, UA 8.5     Leukocytes, UA 2+     Nitrite, UA Negative     Protein, UA Trace mg/dl      Glucose, UA Negative mg/dl      Ketones, UA Negative mg/dl      Urobilinogen, UA 0.2 E.U./dl      Bilirubin, UA Negative     Occult Blood, UA 2+    CBC and differential [[id number]]  (Abnormal) Collected: 12/08/24 0931    Lab Status: Final result Specimen: Blood from Arm, Right Updated: 12/08/24 1015     WBC 19.10 Thousand/uL      RBC 4.61 Million/uL      Hemoglobin 13.5 g/dL      Hematocrit 39.8 %      MCV 86 fL      MCH 29.3 pg      MCHC 33.9 g/dL      RDW  12.7 %      MPV 10.3 fL      Platelets 394 Thousands/uL     Narrative:      This is an appended report.  These results have been appended to a previously verified report.    FLU/RSV/COVID - if FLU/RSV clinically relevant (2hr TAT) [[id number]]  (Normal) Collected: 12/08/24 0931    Lab Status: Final result Specimen: Nares from Nose Updated: 12/08/24 1015     SARS-CoV-2 Negative     INFLUENZA A PCR Negative     INFLUENZA B PCR Negative     RSV PCR Negative    Narrative:      This test has been performed using the CoV-2/Flu/RSV plus assay on the [employer]pert platform. This test has been validated by the  and verified by the performing laboratory.     This test is designed to amplify and detect the following: nucleocapsid (N), envelope (E), and RNA-dependent RNA polymerase (RdRP) genes of the SARS-CoV-2 genome; matrix (M), basic polymerase (PB2), and acidic protein (PA) segments of the influenza A genome; matrix (M) and non-structural protein (NS) segments of the influenza B genome, and the nucleocapsid genes of RSV A and RSV B.     Positive results are indicative of the presence of Flu A, Flu B, RSV, and/or SARS-CoV-2 RNA. Positive results for SARS-CoV-2 or suspected novel influenza should be reported to state, local, or federal health departments according to local reporting requirements.      All results should be assessed in conjunction with clinical presentation and other laboratory markers for clinical management.     FOR PEDIATRIC PATIENTS - copy/paste COVID Guidelines URL to browser: [email or web address]       Manual Differential(PHLEBS Do Not Order) [[id number]]  (Abnormal) Collected: 12/08/24 0931    Lab Status: Final result Specimen: Blood from Arm, Right Updated: 12/08/24 1015     Segmented % 95 %      Lymphocytes % 2 %      Monocytes % 3 %      Eosinophils % 0 %      Basophils % 0 %      Absolute Neutrophils 18.15 Thousand/uL      Absolute  Lymphocytes 0.38 Thousand/uL      Absolute Monocytes 0.57 Thousand/uL      Absolute Eosinophils 0.00 Thousand/uL      Absolute Basophils 0.00 Thousand/uL      Total Counted --     RBC Morphology Normal     Platelet Estimate Increased    Comprehensive metabolic panel [[id number]]  (Abnormal) Collected: 12/08/24 0931    Lab Status: Final result Specimen: Blood from Arm, Right Updated: 12/08/24 0952     Sodium 138 mmol/L      Potassium 3.5 mmol/L      Chloride 106 mmol/L      CO2 21 mmol/L      ANION GAP 11 mmol/L      BUN 12 mg/dL      Creatinine 0.66 mg/dL      Glucose 129 mg/dL      Calcium 8.7 mg/dL      AST 12 U/L      ALT 11 U/L      Alkaline Phosphatase 70 U/L      Total Protein 7.2 g/dL      Albumin 3.7 g/dL      Total Bilirubin 0.73 mg/dL      eGFR 127 ml/min/1.73sq m     Narrative:      National Kidney Disease Foundation guidelines for Chronic Kidney Disease (CKD):     Stage 1 with normal or high GFR (GFR > 90 mL/min/1.73 square meters)    Stage 2 Mild CKD (GFR = 60-89 mL/min/1.73 square meters)    Stage 3A Moderate CKD (GFR = 45-59 mL/min/1.73 square meters)    Stage 3B Moderate CKD (GFR = 30-44 mL/min/1.73 square meters)    Stage 4 Severe CKD (GFR = 15-29 mL/min/1.73 square meters)    Stage 5 End Stage CKD (GFR <15 mL/min/1.73 square meters)  Note: GFR calculation is accurate only with a steady state creatinine    Lipase [[id number]]  (Abnormal) Collected: 12/08/24 0931    Lab Status: Final result Specimen: Blood from Arm, Right Updated: 12/08/24 0952     Lipase 9 u/L     Magnesium [[id number]]  (Abnormal) Collected: 12/08/24 0931    Lab Status: Final result Specimen: Blood from Arm, Right Updated: 12/08/24 0952     Magnesium 1.5 mg/dL             CT abdomen pelvis with contrast   Final Interpretation by Cruz Melissa DO (12/08 1105)      No acute intra-abdominal abnormality. The uterus is diffusely enlarged and heterogeneously enhancing attributed to history of recent vaginal delivery..          Workstation performed: [id number]             Procedures    ED Medication and Procedure Management   Prior to Admission Medications   Prescriptions Last Dose Informant Patient Reported? Taking?   Levonorgestrel (MIRENA) 20 MCG/DAY IUD Past Week  No Yes   Si Intra Uterine Device by Intrauterine Device route Once every 8 years   Magnesium Oxide 250 MG TABS Past Week  No Yes   Sig: Take 1 tablet (250 mg total) by mouth daily   Prenatal 27-1 MG TABS Past Week  No Yes   Sig: Take 1 tablet by mouth in the morning   acetaminophen (TYLENOL) 325 mg tablet Past Week  No Yes   Sig: Take 2 tablets (650 mg total) by mouth every 4 (four) hours as needed for mild pain   ibuprofen (MOTRIN) 600 mg tablet Past Week  No Yes   Sig: Take 1 tablet (600 mg total) by mouth every 6 (six) hours      Facility-Administered Medications: None     Discharge Medication List as of 2024 11:51 AM        START taking these medications    Details   cephalexin (KEFLEX) 500 mg capsule Take 1 capsule (500 mg total) by mouth every 12 (twelve) hours for 7 days, Starting Sun 2024, Until Sun 12/15/2024, Normal      ondansetron (ZOFRAN-ODT) 4 mg disintegrating tablet Take 1 tablet (4 mg total) by mouth every 6 (six) hours as needed for nausea or vomiting, Starting Sun 2024, Normal           CONTINUE these medications which have NOT CHANGED    Details   acetaminophen (TYLENOL) 325 mg tablet Take 2 tablets (650 mg total) by mouth every 4 (four) hours as needed for mild pain, Starting 2024, No Print      ibuprofen (MOTRIN) 600 mg tablet Take 1 tablet (600 mg total) by mouth every 6 (six) hours, Starting 2024, Normal      Levonorgestrel (MIRENA) 20 MCG/DAY IUD 1 Intra Uterine Device by Intrauterine Device route Once every 8 years, Starting 2032, No Print      Magnesium Oxide 250 MG TABS Take 1 tablet (250 mg total) by mouth daily, Starting 2024, Normal      Prenatal 27-1 MG TABS Take 1 tablet by mouth in  the morning, Starting Mon 10/7/2024, Normal           No discharge procedures on file.  ED SEPSIS DOCUMENTATION   Time reflects when diagnosis was documented in both MDM as applicable and the Disposition within this note       Time User Action Codes Description Comment    12/8/2024 11:22 AM Beatrice Corral [R11.2,  R19.7] Nausea vomiting and diarrhea     12/8/2024 11:22 AM Beatrice Corral [N39.0] UTI (urinary tract infection)     12/8/2024 11:22 AM Beatrice Corral [R51.9] Headache     12/8/2024 11:23 AM Beatrice Corral [E83.42] Hypomagnesemia     12/8/2024 11:23 AM Beatrice Corral [D72.829] Leukocytosis                  Beatrice Corral PA-C  12/08/24 1810     12-Sep-2018 23:00

## 2024-12-09 LAB — BACTERIA UR CULT: NORMAL

## 2024-12-11 NOTE — TELEPHONE ENCOUNTER
POSTPARTUM PHONE CALL ASSESSMENT    Date of Delivery: 24  Delivering Provider: Dr. Akers  Mode:   Delivery Notes/Complications: 2 degree laceration repaired. Mirena placed.     Do you still have bleeding/pain? Patient reports bleeding is getting lighter (spotting mostly) and no pain.  Regular BMs/Urination? Yes.  Breastfeeding/Formula/Both? Formula.  How are you doing emotionally? Patient reports to be doing well emotionally.   Do you have any other questions or concerns for us or your provider? No.  Have you scheduled the pediatrician appointment with pediatrician? Yes.    Do you have a postpartum visit scheduled? Yes.   Date scheduled: 25 Provider: Valentine Fowler CNM

## 2025-01-16 PROBLEM — Z3A.39 39 WEEKS GESTATION OF PREGNANCY: Status: RESOLVED | Noted: 2024-06-26 | Resolved: 2025-01-16

## 2025-01-17 ENCOUNTER — POSTPARTUM VISIT (OUTPATIENT)
Dept: OBGYN CLINIC | Facility: CLINIC | Age: 20
End: 2025-01-17
Payer: COMMERCIAL

## 2025-01-17 ENCOUNTER — TELEPHONE (OUTPATIENT)
Age: 20
End: 2025-01-17

## 2025-01-17 VITALS
HEIGHT: 65 IN | BODY MASS INDEX: 17.13 KG/M2 | SYSTOLIC BLOOD PRESSURE: 104 MMHG | WEIGHT: 102.8 LBS | DIASTOLIC BLOOD PRESSURE: 68 MMHG

## 2025-01-17 DIAGNOSIS — F32.A ANXIETY AND DEPRESSION: ICD-10-CM

## 2025-01-17 DIAGNOSIS — F41.9 ANXIETY AND DEPRESSION: ICD-10-CM

## 2025-01-17 RX ORDER — PAROXETINE HYDROCHLORIDE HEMIHYDRATE 12.5 MG/1
12.5 TABLET, FILM COATED, EXTENDED RELEASE ORAL EVERY MORNING
Qty: 30 TABLET | Refills: 3 | Status: SHIPPED | OUTPATIENT
Start: 2025-01-17 | End: 2025-01-20

## 2025-01-17 NOTE — PROGRESS NOTES
Name: Soraya Gómez      : 2005      MRN: [id number]  Encounter Provider: Valentine Fowler CNM  Encounter Date: 2025   Encounter department: [facility] [place]  :  Assessment & Plan  Postpartum care following vaginal delivery      Assessment/Plan:  Soraya is a 19 y.o. year old  who presents for postpartum visit.    Routine Postpartum Care  - Normal postpartum exam  - Contraception: IUD Mirena 2024  - Depression Screen: 10- notes that she is having problems with sleeping. Notes that she has always had sleeping problems and tried OTC products.   - Feeding: formula  - Psychosocial support: trying to get into a counselor  - Patient Education: Postpartum resources including Pelvic Health PT and Baby & Me  - Cervical cancer screening Up to Date, next due after 25  - RTO 3 months for med follow-up and 2025 annual exam    Anxiety and depression  - declines counseling at this time  - would like to try medication  - in any thoughts of harm to self or others to go to ED  Next visit 3 months for follow-up  Orders:  •  PARoxetine (PAXIL-CR) 12.5 mg 24 hr tablet; Take 1 tablet (12.5 mg total) by mouth every morning        History of Present Illness     She is approximately 7 weeks postpartum following a  on 2024 at 39 weeks. Postpartum course has been unremarkable. Bleeding no bleeding. Bowel function is normal. Bladder function is normal. Patient is not sexually active. Contraception method is IUD. Postpartum depression screening: 10.    Baby's course has been meeting milestones. Baby is feeding by bottle - Similac total comfort.         Soraya Gómez is a 19 y.o. female who presents postpartum exam  History obtained from: patient    Review of Systems   Constitutional:  Negative for chills and fever.   Respiratory:  Negative for cough and shortness of breath.    Cardiovascular:  Negative for chest pain and palpitations.   Gastrointestinal:  Negative for constipation and  "diarrhea.   Genitourinary:  Negative for difficulty urinating, dysuria, frequency, menstrual problem, pelvic pain, urgency, vaginal bleeding, vaginal discharge and vaginal pain.   Psychiatric/Behavioral:  Positive for sleep disturbance. Negative for self-injury. The patient is nervous/anxious.      Medical History Reviewed by provider this encounter:  Tobacco  Allergies  Meds  Problems  Med Hx  Surg Hx  Fam Hx     .  Current Outpatient Medications on File Prior to Visit   Medication Sig Dispense Refill   • acetaminophen (TYLENOL) 325 mg tablet Take 2 tablets (650 mg total) by mouth every 4 (four) hours as needed for mild pain     • [START ON 11/29/2032] Levonorgestrel (MIRENA) 20 MCG/DAY IUD 1 Intra Uterine Device by Intrauterine Device route Once every 8 years     • [DISCONTINUED] ibuprofen (MOTRIN) 600 mg tablet Take 1 tablet (600 mg total) by mouth every 6 (six) hours 30 tablet 0   • [DISCONTINUED] Magnesium Oxide 250 MG TABS Take 1 tablet (250 mg total) by mouth daily (Patient not taking: Reported on 1/17/2025) 30 tablet 2   • [DISCONTINUED] ondansetron (ZOFRAN-ODT) 4 mg disintegrating tablet Take 1 tablet (4 mg total) by mouth every 6 (six) hours as needed for nausea or vomiting (Patient not taking: Reported on 1/17/2025) 20 tablet 0   • [DISCONTINUED] Prenatal 27-1 MG TABS Take 1 tablet by mouth in the morning (Patient not taking: Reported on 1/17/2025) 90 tablet 1     No current facility-administered medications on file prior to visit.      Social History     Tobacco Use   • Smoking status: Never   • Smokeless tobacco: Never   Vaping Use   • Vaping status: Former   • Substances: Nicotine, Flavoring   Substance and Sexual Activity   • Alcohol use: Never   • Drug use: Never   • Sexual activity: Not Currently     Partners: Male     Birth control/protection: I.U.D.        Objective   /68   Ht 5' 5\" (1.651 m)   Wt 46.6 kg (102 lb 12.8 oz)   LMP 01/10/2025 (Exact Date)   Breastfeeding No   BMI " 17.11 kg/m²      Physical Exam  Vitals reviewed.   Pulmonary:      Effort: Pulmonary effort is normal.   Genitourinary:     General: Normal vulva.      Labia:         Right: No rash, tenderness or lesion.         Left: No rash, tenderness or lesion.       Urethra: No urethral pain, urethral swelling or urethral lesion.      Vagina: No vaginal discharge, erythema, tenderness, bleeding or lesions.      Cervix: No cervical motion tenderness, discharge, friability, lesion or erythema.      Uterus: Normal. Not enlarged and not tender.       Adnexa:         Right: No mass, tenderness or fullness.          Left: No mass, tenderness or fullness.        Comments: IUD string noted and length appropriate.   Skin:     Capillary Refill: Capillary refill takes less than 2 seconds.   Neurological:      Mental Status: She is alert.   Psychiatric:         Mood and Affect: Mood normal.         Behavior: Behavior normal.

## 2025-01-17 NOTE — TELEPHONE ENCOUNTER
PA for PARoxetine (PAXIL-CR) 12.5 mg SUBMITTED to [employer]    via    []CMM-KEY:   []Surescripts-Case ID #   []Availity-Auth ID # NDC #   []Faxed to plan   [x]Other website PromptPA- [id number]  []Phone call Case ID #     []PA sent as URGENT    All office notes, labs and other pertaining documents and studies sent. Clinical questions answered. Awaiting determination from insurance company.     Turnaround time for your insurance to make a decision on your Prior Authorization can take 7-21 business days.

## 2025-01-20 NOTE — TELEPHONE ENCOUNTER
PA for PARoxetine (PAXIL-CR) 12.5 mg DENIED    Reason:(Screenshot if applicable)        Message sent to office clinical pool Yes    Denial letter scanned into Media Yes    Appeal started No (Provider will need to decide if appeal is warranted and send clinical documentation to Prior Authorization Team for initiation.)    **Please follow up with your patient regarding denial and next steps**

## 2025-04-21 NOTE — PROGRESS NOTES
Name: Soraya Gómez      : 2005      MRN: [id number]  Encounter Provider: Valentine Fowler CNM  Encounter Date: 2025   Encounter department: [facility] [place]  :  Assessment & Plan  Anxiety and depression  Depression Screening Follow-up Plan: Patient's depression screening was positive with an Roosevelt  Depression Scale score of 14.   Will change medication to Buspar 7.5 mg 2 times daily    Currently does not desire counseling. If this change does not improve symptoms, plan on counseling  To contact me in 2 weeks with symptoms  If any thoughts of harming self or others to go to emergency room  Has follow-up in August for yearly exam, will schedule visit for depression sooner if not improving      Orders:    busPIRone (BUSPAR) 7.5 mg tablet; Take 1 tablet (7.5 mg total) by mouth 2 (two) times a day        History of Present Illness   Soraya presents for 3 month follow-up Paxil for anxiety/depression. Has Mirena for birth control. Menses is absent.  Initial depression score was 10. Current Depression score is : 14. She has been on the Paxil 10 mg with no improvement. Her baby is starting to sleep through the night and she is unable to fall asleep. Note that she is just up thinking, usually thinking about what needs to be done or what she has to get done. Notes that she is home with baby all day and tries to go out when weather is nice. She has a partner, but is still responsible for majority of infants care and household. Feels that small things make her anxious and notes that this was a problem in the past and also notes that her family members- both parents suffer from anxiety. Sometimes feels trapped in the house with baby. Declines counseling at this time due to getting to appointments. She is bottle feeding.     Soraya Gómez is a 19 y.o. female who presents for follow-up postpartum depression  History obtained from: patient    Review of Systems   Respiratory:  Negative  "for cough and shortness of breath.    Cardiovascular:  Negative for chest pain and palpitations.   Psychiatric/Behavioral:  Positive for agitation and sleep disturbance. Negative for self-injury and suicidal ideas. The patient is nervous/anxious.      Medical History Reviewed by provider this encounter:     .  Current Outpatient Medications on File Prior to Visit   Medication Sig Dispense Refill    [START ON 11/29/2032] Levonorgestrel (MIRENA) 20 MCG/DAY IUD 1 Intra Uterine Device by Intrauterine Device route Once every 8 years      acetaminophen (TYLENOL) 325 mg tablet Take 2 tablets (650 mg total) by mouth every 4 (four) hours as needed for mild pain (Patient not taking: Reported on 4/22/2025)       No current facility-administered medications on file prior to visit.      Social History     Tobacco Use    Smoking status: Never    Smokeless tobacco: Never   Vaping Use    Vaping status: Former    Substances: Nicotine, Flavoring   Substance and Sexual Activity    Alcohol use: Never    Drug use: Never    Sexual activity: Not Currently     Partners: Male     Birth control/protection: I.U.D.        Objective   /66   Ht 5' 5\" (1.651 m)   Wt 41.7 kg (92 lb)   BMI 15.31 kg/m²      Physical Exam  Vitals reviewed.   Constitutional:       Appearance: Normal appearance.   Neck:      Thyroid: No thyroid mass or thyroid tenderness.   Cardiovascular:      Rate and Rhythm: Normal rate.   Pulmonary:      Effort: Pulmonary effort is normal.   Musculoskeletal:         General: Normal range of motion.   Skin:     General: Skin is warm and dry.      Capillary Refill: Capillary refill takes less than 2 seconds.   Neurological:      Mental Status: She is alert and oriented to person, place, and time.   Psychiatric:         Mood and Affect: Mood normal.         Behavior: Behavior normal.         "

## 2025-04-22 ENCOUNTER — OFFICE VISIT (OUTPATIENT)
Dept: OBGYN CLINIC | Facility: CLINIC | Age: 20
End: 2025-04-22
Payer: COMMERCIAL

## 2025-04-22 VITALS
SYSTOLIC BLOOD PRESSURE: 118 MMHG | DIASTOLIC BLOOD PRESSURE: 66 MMHG | WEIGHT: 92 LBS | BODY MASS INDEX: 15.33 KG/M2 | HEIGHT: 65 IN

## 2025-04-22 DIAGNOSIS — F32.A ANXIETY AND DEPRESSION: Primary | ICD-10-CM

## 2025-04-22 DIAGNOSIS — F41.9 ANXIETY AND DEPRESSION: Primary | ICD-10-CM

## 2025-04-22 PROCEDURE — 99213 OFFICE O/P EST LOW 20 MIN: CPT | Performed by: ADVANCED PRACTICE MIDWIFE

## 2025-04-22 RX ORDER — BUSPIRONE HYDROCHLORIDE 7.5 MG/1
7.5 TABLET ORAL 2 TIMES DAILY
Qty: 60 TABLET | Refills: 1 | Status: SHIPPED | OUTPATIENT
Start: 2025-04-22

## 2025-05-07 ENCOUNTER — TELEPHONE (OUTPATIENT)
Age: 20
End: 2025-05-07

## 2025-05-07 NOTE — TELEPHONE ENCOUNTER
Patient calling in to give feedback regarding new medication.  She advised she is doing well on the Buspar and states it is working better than previous medication.

## 2025-06-04 ENCOUNTER — VBI (OUTPATIENT)
Dept: ADMINISTRATIVE | Facility: OTHER | Age: 20
End: 2025-06-04

## 2025-06-04 NOTE — TELEPHONE ENCOUNTER
06/04/25 11:23 AM     Chart reviewed for [facility] Visits was/were not submitted to the patient's insurance.     Danyell Doyle MA   [facility]

## 2025-08-11 ENCOUNTER — ANNUAL EXAM (OUTPATIENT)
Dept: OBGYN CLINIC | Facility: CLINIC | Age: 20
End: 2025-08-11
Payer: COMMERCIAL

## 2025-08-11 PROBLEM — O42.90 AMNIOTIC FLUID LEAKING: Status: RESOLVED | Noted: 2024-11-28 | Resolved: 2025-08-11

## 2025-08-11 PROBLEM — Z34.93 SUPERVISION OF NORMAL PREGNANCY IN THIRD TRIMESTER: Status: RESOLVED | Noted: 2024-06-26 | Resolved: 2025-08-11

## 2025-08-12 ENCOUNTER — VBI (OUTPATIENT)
Dept: ADMINISTRATIVE | Facility: OTHER | Age: 20
End: 2025-08-12